# Patient Record
Sex: FEMALE | Race: WHITE | Employment: FULL TIME | URBAN - NONMETROPOLITAN AREA
[De-identification: names, ages, dates, MRNs, and addresses within clinical notes are randomized per-mention and may not be internally consistent; named-entity substitution may affect disease eponyms.]

---

## 2019-04-23 ENCOUNTER — OFFICE VISIT (OUTPATIENT)
Dept: FAMILY MEDICINE CLINIC | Age: 44
End: 2019-04-23
Payer: COMMERCIAL

## 2019-04-23 VITALS
RESPIRATION RATE: 12 BRPM | TEMPERATURE: 97.7 F | HEART RATE: 98 BPM | BODY MASS INDEX: 22.5 KG/M2 | OXYGEN SATURATION: 97 % | SYSTOLIC BLOOD PRESSURE: 138 MMHG | DIASTOLIC BLOOD PRESSURE: 88 MMHG | HEIGHT: 64 IN | WEIGHT: 131.8 LBS

## 2019-04-23 DIAGNOSIS — M54.41 CHRONIC MIDLINE LOW BACK PAIN WITH BILATERAL SCIATICA: ICD-10-CM

## 2019-04-23 DIAGNOSIS — M54.42 CHRONIC MIDLINE LOW BACK PAIN WITH BILATERAL SCIATICA: ICD-10-CM

## 2019-04-23 DIAGNOSIS — F32.A DEPRESSION, UNSPECIFIED DEPRESSION TYPE: ICD-10-CM

## 2019-04-23 DIAGNOSIS — Z23 NEED FOR TDAP VACCINATION: ICD-10-CM

## 2019-04-23 DIAGNOSIS — Z01.00 EYE EXAM NORMAL: ICD-10-CM

## 2019-04-23 DIAGNOSIS — B37.9 YEAST INFECTION: ICD-10-CM

## 2019-04-23 DIAGNOSIS — G89.29 CHRONIC MIDLINE LOW BACK PAIN WITH BILATERAL SCIATICA: ICD-10-CM

## 2019-04-23 DIAGNOSIS — I10 HYPERTENSION, UNSPECIFIED TYPE: ICD-10-CM

## 2019-04-23 DIAGNOSIS — F51.01 PRIMARY INSOMNIA: ICD-10-CM

## 2019-04-23 DIAGNOSIS — Z00.00 ROUTINE HEALTH MAINTENANCE: Primary | ICD-10-CM

## 2019-04-23 DIAGNOSIS — G57.93 NEUROPATHIC PAIN OF BOTH LEGS: ICD-10-CM

## 2019-04-23 DIAGNOSIS — G25.81 RESTLESS LEG SYNDROME: ICD-10-CM

## 2019-04-23 DIAGNOSIS — Z11.4 SCREENING FOR HIV (HUMAN IMMUNODEFICIENCY VIRUS): ICD-10-CM

## 2019-04-23 DIAGNOSIS — Z12.4 CERVICAL CANCER SCREENING: ICD-10-CM

## 2019-04-23 PROCEDURE — 90471 IMMUNIZATION ADMIN: CPT | Performed by: NURSE PRACTITIONER

## 2019-04-23 PROCEDURE — G0444 DEPRESSION SCREEN ANNUAL: HCPCS | Performed by: NURSE PRACTITIONER

## 2019-04-23 PROCEDURE — 99386 PREV VISIT NEW AGE 40-64: CPT | Performed by: NURSE PRACTITIONER

## 2019-04-23 PROCEDURE — 90715 TDAP VACCINE 7 YRS/> IM: CPT | Performed by: NURSE PRACTITIONER

## 2019-04-23 RX ORDER — TRAZODONE HYDROCHLORIDE 100 MG/1
100 TABLET ORAL NIGHTLY
COMMUNITY
End: 2019-04-23 | Stop reason: SDUPTHER

## 2019-04-23 RX ORDER — LAMOTRIGINE 25 MG/1
25 TABLET ORAL DAILY
COMMUNITY
End: 2019-04-23 | Stop reason: SDUPTHER

## 2019-04-23 RX ORDER — PRAZOSIN HYDROCHLORIDE 1 MG/1
1 CAPSULE ORAL NIGHTLY
COMMUNITY
End: 2019-04-23 | Stop reason: SDUPTHER

## 2019-04-23 RX ORDER — HYDROCODONE BITARTRATE AND ACETAMINOPHEN 5; 325 MG/1; MG/1
1 TABLET ORAL EVERY 6 HOURS PRN
COMMUNITY
End: 2019-09-16 | Stop reason: ALTCHOICE

## 2019-04-23 RX ORDER — VENLAFAXINE HYDROCHLORIDE 75 MG/1
75 TABLET, EXTENDED RELEASE ORAL
COMMUNITY
End: 2019-04-23 | Stop reason: SDUPTHER

## 2019-04-23 RX ORDER — FLUCONAZOLE 150 MG/1
TABLET ORAL
Qty: 2 TABLET | Refills: 0 | Status: SHIPPED | OUTPATIENT
Start: 2019-04-23 | End: 2019-09-16 | Stop reason: ALTCHOICE

## 2019-04-23 RX ORDER — TRAZODONE HYDROCHLORIDE 100 MG/1
100 TABLET ORAL NIGHTLY
Qty: 30 TABLET | Refills: 5 | Status: SHIPPED | OUTPATIENT
Start: 2019-04-23 | End: 2019-09-16 | Stop reason: ALTCHOICE

## 2019-04-23 RX ORDER — AMLODIPINE BESYLATE 5 MG/1
5 TABLET ORAL DAILY
COMMUNITY
End: 2019-04-23 | Stop reason: SDUPTHER

## 2019-04-23 RX ORDER — LAMOTRIGINE 25 MG/1
25 TABLET ORAL DAILY
Qty: 30 TABLET | Refills: 5 | Status: SHIPPED | OUTPATIENT
Start: 2019-04-23 | End: 2019-09-16 | Stop reason: ALTCHOICE

## 2019-04-23 RX ORDER — PRAZOSIN HYDROCHLORIDE 1 MG/1
1 CAPSULE ORAL NIGHTLY
Qty: 30 CAPSULE | Refills: 5 | Status: SHIPPED | OUTPATIENT
Start: 2019-04-23 | End: 2020-07-13 | Stop reason: SDUPTHER

## 2019-04-23 RX ORDER — DISULFIRAM 250 MG/1
250 TABLET ORAL DAILY
COMMUNITY
End: 2019-04-23 | Stop reason: ALTCHOICE

## 2019-04-23 RX ORDER — VENLAFAXINE HYDROCHLORIDE 75 MG/1
75 TABLET, EXTENDED RELEASE ORAL
Qty: 30 TABLET | Refills: 5 | Status: SHIPPED | OUTPATIENT
Start: 2019-04-23 | End: 2019-09-16 | Stop reason: ALTCHOICE

## 2019-04-23 RX ORDER — AMLODIPINE BESYLATE 5 MG/1
5 TABLET ORAL DAILY
Qty: 30 TABLET | Refills: 5 | Status: SHIPPED | OUTPATIENT
Start: 2019-04-23 | End: 2019-09-16 | Stop reason: ALTCHOICE

## 2019-04-23 RX ORDER — GABAPENTIN 300 MG/1
300 CAPSULE ORAL 3 TIMES DAILY
COMMUNITY
End: 2019-09-16 | Stop reason: SDUPTHER

## 2019-04-23 SDOH — HEALTH STABILITY: MENTAL HEALTH: HOW OFTEN DO YOU HAVE A DRINK CONTAINING ALCOHOL?: NEVER

## 2019-04-23 ASSESSMENT — PATIENT HEALTH QUESTIONNAIRE - PHQ9
2. FEELING DOWN, DEPRESSED OR HOPELESS: 2
SUM OF ALL RESPONSES TO PHQ QUESTIONS 1-9: 13
1. LITTLE INTEREST OR PLEASURE IN DOING THINGS: 2
9. THOUGHTS THAT YOU WOULD BE BETTER OFF DEAD, OR OF HURTING YOURSELF: 0
5. POOR APPETITE OR OVEREATING: 1
8. MOVING OR SPEAKING SO SLOWLY THAT OTHER PEOPLE COULD HAVE NOTICED. OR THE OPPOSITE, BEING SO FIGETY OR RESTLESS THAT YOU HAVE BEEN MOVING AROUND A LOT MORE THAN USUAL: 0
SUM OF ALL RESPONSES TO PHQ9 QUESTIONS 1 & 2: 4
SUM OF ALL RESPONSES TO PHQ QUESTIONS 1-9: 13
10. IF YOU CHECKED OFF ANY PROBLEMS, HOW DIFFICULT HAVE THESE PROBLEMS MADE IT FOR YOU TO DO YOUR WORK, TAKE CARE OF THINGS AT HOME, OR GET ALONG WITH OTHER PEOPLE: 1
6. FEELING BAD ABOUT YOURSELF - OR THAT YOU ARE A FAILURE OR HAVE LET YOURSELF OR YOUR FAMILY DOWN: 1
4. FEELING TIRED OR HAVING LITTLE ENERGY: 2
7. TROUBLE CONCENTRATING ON THINGS, SUCH AS READING THE NEWSPAPER OR WATCHING TELEVISION: 2
3. TROUBLE FALLING OR STAYING ASLEEP: 3

## 2019-04-23 ASSESSMENT — ENCOUNTER SYMPTOMS
GASTROINTESTINAL NEGATIVE: 1
RESPIRATORY NEGATIVE: 1
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
## 2019-04-23 NOTE — PATIENT INSTRUCTIONS
Patient Education        Learning About Benefits From Quitting Smoking  How does quitting smoking make you healthier? If you're thinking about quitting smoking, you may have a few reasons to be smoke-free. Your health may be one of them. · When you quit smoking, you lower your risks for cancer, lung disease, heart attack, stroke, blood vessel disease, and blindness from macular degeneration. · When you're smoke-free, you get sick less often, and you heal faster. You are less likely to get colds, flu, bronchitis, and pneumonia. · As a nonsmoker, you may find that your mood is better and you are less stressed. When and how will you feel healthier? Quitting has real health benefits that start from day 1 of being smoke-free. And the longer you stay smoke-free, the healthier you get and the better you feel. The first hours  · After just 20 minutes, your blood pressure and heart rate go down. That means there's less stress on your heart and blood vessels. · Within 12 hours, the level of carbon monoxide in your blood drops back to normal. That makes room for more oxygen. With more oxygen in your body, you may notice that you have more energy than when you smoked. After 2 weeks  · Your lungs start to work better. · Your risk of heart attack starts to drop. After 1 month  · When your lungs are clear, you cough less and breathe deeper, so it's easier to be active. · Your sense of taste and smell return. That means you can enjoy food more than you have since you started smoking. Over the years  · After 1 year, your risk of heart disease is half what it would be if you kept smoking. · After 5 years, your risk of stroke starts to shrink. Within a few years after that, it's about the same as if you'd never smoked. · After 10 years, your risk of dying from lung cancer is cut by about half. And your risk for many other types of cancer is lower too. How would quitting help others in your life?   When you quit smoking, you improve the health of everyone who now breathes in your smoke. · Their heart, lung, and cancer risks drop, much like yours. · They are sick less. For babies and small children, living smoke-free means they're less likely to have ear infections, pneumonia, and bronchitis. · If you're a woman who is or will be pregnant someday, quitting smoking means a healthier . · Children who are close to you are less likely to become adult smokers. Where can you learn more? Go to https://[employer]list[employer]. org and sign in to your [employer] account. Enter [phone] 11 in the [employer] box to learn more about \"Learning About Benefits From Quitting Smoking. \"     If you do not have an account, please click on the \"Sign Up Now\" link. Current as of: 2018  Content Version: 11.9  © 9363-2377 [employer]. Care instructions adapted under license by [facility]. If you have questions about a medical condition or this instruction, always ask your healthcare professional. [address] any warranty or liability for your use of this information.

## 2019-04-30 ENCOUNTER — HOSPITAL ENCOUNTER (EMERGENCY)
Age: 44
Discharge: HOME | End: 2019-04-30
Attending: EMERGENCY MEDICINE
Payer: COMMERCIAL

## 2019-04-30 VITALS
HEIGHT: 64 IN | TEMPERATURE: 97.9 F | HEART RATE: 94 BPM | BODY MASS INDEX: 22.71 KG/M2 | WEIGHT: 133 LBS | RESPIRATION RATE: 14 BRPM | SYSTOLIC BLOOD PRESSURE: 161 MMHG | OXYGEN SATURATION: 98 % | DIASTOLIC BLOOD PRESSURE: 105 MMHG

## 2019-04-30 DIAGNOSIS — N30.00 ACUTE CYSTITIS WITHOUT HEMATURIA: Primary | ICD-10-CM

## 2019-04-30 LAB
-: ABNORMAL
AMORPHOUS: ABNORMAL
BACTERIA: ABNORMAL
BILIRUBIN URINE: NEGATIVE
CASTS UA: ABNORMAL /LPF (ref 0–2)
COLOR: ABNORMAL
COMMENT UA: ABNORMAL
CRYSTALS, UA: ABNORMAL /HPF
EPITHELIAL CELLS UA: ABNORMAL /HPF (ref 0–5)
GLUCOSE URINE: NEGATIVE
KETONES, URINE: NEGATIVE
LEUKOCYTE ESTERASE, URINE: ABNORMAL
MUCUS: ABNORMAL
NITRITE, URINE: NEGATIVE
OTHER OBSERVATIONS UA: ABNORMAL
PH UA: 6 (ref 5–6)
PROTEIN UA: NEGATIVE
RBC UA: ABNORMAL /HPF (ref 0–4)
RENAL EPITHELIAL, UA: ABNORMAL /HPF
SPECIFIC GRAVITY UA: 1.03 (ref 1.01–1.02)
TRICHOMONAS: ABNORMAL
TURBIDITY: ABNORMAL
URINE HGB: ABNORMAL
UROBILINOGEN, URINE: NORMAL
WBC UA: ABNORMAL /HPF (ref 0–4)
YEAST: ABNORMAL

## 2019-04-30 PROCEDURE — 81001 URINALYSIS AUTO W/SCOPE: CPT

## 2019-04-30 PROCEDURE — 99283 EMERGENCY DEPT VISIT LOW MDM: CPT

## 2019-04-30 RX ORDER — PHENAZOPYRIDINE HYDROCHLORIDE 200 MG/1
200 TABLET, FILM COATED ORAL 3 TIMES DAILY PRN
Qty: 10 TABLET | Refills: 0 | Status: SHIPPED | OUTPATIENT
Start: 2019-04-30 | End: 2019-09-16 | Stop reason: ALTCHOICE

## 2019-04-30 RX ORDER — SULFAMETHOXAZOLE AND TRIMETHOPRIM 800; 160 MG/1; MG/1
1 TABLET ORAL 2 TIMES DAILY
Qty: 20 TABLET | Refills: 0 | Status: SHIPPED | OUTPATIENT
Start: 2019-04-30 | End: 2019-05-10

## 2019-04-30 ASSESSMENT — PAIN DESCRIPTION - LOCATION: LOCATION: BACK

## 2019-04-30 ASSESSMENT — PAIN SCALES - GENERAL: PAINLEVEL_OUTOF10: 10

## 2019-04-30 ASSESSMENT — PAIN DESCRIPTION - PAIN TYPE: TYPE: ACUTE PAIN

## 2019-04-30 NOTE — ED PROVIDER NOTES
HYDROCODONE-ACETAMINOPHEN (NORCO) 5-325 MG PER TABLET    Take 1 tablet by mouth every 6 hours as needed for Pain. LAMOTRIGINE (LAMICTAL) 25 MG TABLET    Take 1 tablet by mouth daily    PRAZOSIN (MINIPRESS) 1 MG CAPSULE    Take 1 capsule by mouth nightly    TRAZODONE (DESYREL) 100 MG TABLET    Take 1 tablet by mouth nightly    VENLAFAXINE 75 MG EXTENDED RELEASE TABLET    Take 1 tablet by mouth daily (with breakfast)       ALLERGIES     has No Known Allergies. FAMILY HISTORY     indicated that her mother is alive. She indicated that her father is alive. family history includes Heart Disease in her father. SOCIAL HISTORY      reports that she has been smoking cigarettes. She has a 15.00 pack-year smoking history. She has never used smokeless tobacco. She reports that she drinks alcohol. She reports that she does not use drugs. PHYSICAL EXAM     INITIAL VITALS:  height is 5' 4\" (1.626 m) and weight is 133 lb (60.3 kg). Her tympanic temperature is 97.9 °F (36.6 °C). Her blood pressure is 165/101 (abnormal) and her pulse is 94. Her respiration is 14 and oxygen saturation is 98%. Constitutional: The patient is alert and well-developed, vital signs as noted. Psychiatric: Oriented to time, place and person, affect is appropriate for age. Eyes: Pupils equal and reactive to light. EOMI. Ears, nose, and throat: Oropharynx clear  Neck: No masses, trachea midline, and no thyromegaly. Respiratory: Clear to auscultation, full aeration throughout all fields. Cardiovascular: No murmurs, heart sounds normal, no thrills. Gastrointestinal: No masses, no hepatosplenomegaly, bowel sounds positive. No tenderness. Skin: No rashes or lesions on exposed surfaces, good skin turgor. No Flank pain and appears nontoxic and well-hydrated  Extremities: Good range of motion, no edema.       DIFFERENTIAL DIAGNOSIS/ MEDICAL DECISION MAKING:     Antibiotics and Pyridium as directed    No evidence of pyelonephritis    Follow Exit Care instructions closely. I have reviewed the disposition diagnosis with the patient and or their family/guardian. I have answered their questions and given discharge instructions. They voiced understanding of these instructions and did not have any further questions or complaints. DIAGNOSTIC RESULTS     RADIOLOGY:   Non-plain film images such as CT, Ultrasound and MRI are read by the radiologist. [facility] radiographic images are visualized and preliminarily interpreted by the emergency physician with the below findings:  No orders to display       LABS:  Results for orders placed or performed during the hospital encounter of 04/30/19   Urinalysis Reflex to Culture   Result Value Ref Range    Color, UA NOT REPORTED YELLOW    Turbidity UA NOT REPORTED CLEAR    Glucose, Ur NEGATIVE NEGATIVE    Bilirubin Urine NEGATIVE NEGATIVE    Ketones, Urine NEGATIVE NEGATIVE    Specific Gravity, UA 1.030 (H) 1.010 - 1.025    Urine Hgb 3+ (A) NEGATIVE    pH, UA 6.0 5.0 - 6.0    Protein, UA NEGATIVE NEGATIVE    Urobilinogen, Urine Normal Normal    Nitrite, Urine NEGATIVE NEGATIVE    Leukocyte Esterase, Urine 1+ (A) NEGATIVE    Urinalysis Comments NOT REPORTED    Microscopic Urinalysis   Result Value Ref Range    -          WBC, UA 10 TO 25 0 - 4 /HPF    RBC, UA 25 TO 50 0 - 4 /HPF    Casts UA NOT REPORTED 0 - 2 /LPF    Crystals UA 10 TO 25 CALCIUM OXALATE (A) None /HPF    Epithelial Cells UA 5 TO 10 0 - 5 /HPF    Renal Epithelial, Urine NOT REPORTED 0 /HPF    Bacteria, UA TRACE (A) None    Mucus, UA 1+ (A) None    Trichomonas, UA NOT REPORTED None    Amorphous, UA NOT REPORTED None    Other Observations UA NOT REPORTED NOT REQ.     Yeast, UA NOT REPORTED None       ABNORMAL LABS:  Labs Reviewed   URINE RT REFLEX TO CULTURE - Abnormal; Notable for the following components:       Result Value    Specific Gravity, UA 1.030 (*)     Urine Hgb 3+ (*)     Leukocyte Esterase, Urine 1+ (*)     All other components within normal limits   MICROSCOPIC URINALYSIS - Abnormal; Notable for the following components:    Crystals UA 10 TO 25 CALCIUM OXALATE (*)     Bacteria, UA TRACE (*)     Mucus, UA 1+ (*)     All other components within normal limits        EKG: All EKG's are interpreted by the Emergency Department Physician who either signs or Co-signs this chart in the absence of a cardiologist.        EMERGENCY DEPARTMENT COURSE:   Vitals:    Vitals:    04/30/19 1710   BP: (!) 165/101   Pulse: 94   Resp: 14   Temp: 97.9 °F (36.6 °C)   TempSrc: Tympanic   SpO2: 98%   Weight: 133 lb (60.3 kg)   Height: 5' 4\" (1.626 m)     -------------------------  BP: (!) 165/101, Temp: 97.9 °F (36.6 °C), Pulse: 94, Resp: 14    See DDX/MDM (Differential Diagnosis/Medical Decision Making) above      FINAL IMPRESSION      1.  Acute cystitis without hematuria          DISPOSITION/PLAN   DISPOSITION Decision To Discharge 04/30/2019 06:24:28 PM      Condition on Disposition    Fair    PATIENT REFERRED TO:  TAMARA Kothari - [facility]  [phone]    In 2 days        DISCHARGE MEDICATIONS:  New Prescriptions    PHENAZOPYRIDINE (PYRIDIUM) 200 MG TABLET    Take 1 tablet by mouth 3 times daily as needed for Pain (bladder spasm/pain)    SULFAMETHOXAZOLE-TRIMETHOPRIM (BACTRIM DS) 800-160 MG PER TABLET    Take 1 tablet by mouth 2 times daily for 10 days       (Please note that portions of this note were completed with a voice recognition program.  Efforts were made to edit the dictations but occasionally words are mis-transcribed.)    Jen Danielson,, DO  Attending Emergency Physician       [address] [place] [address],   04/30/19 5449

## 2019-05-30 ENCOUNTER — HOSPITAL ENCOUNTER (OUTPATIENT)
Dept: LAB | Age: 44
Discharge: HOME | End: 2019-05-30
Payer: COMMERCIAL

## 2019-05-30 ENCOUNTER — OFFICE VISIT (OUTPATIENT)
Dept: OBGYN | Age: 44
End: 2019-05-30
Payer: COMMERCIAL

## 2019-05-30 VITALS
RESPIRATION RATE: 20 BRPM | SYSTOLIC BLOOD PRESSURE: 144 MMHG | HEART RATE: 78 BPM | WEIGHT: 135 LBS | HEIGHT: 64 IN | BODY MASS INDEX: 23.05 KG/M2 | DIASTOLIC BLOOD PRESSURE: 98 MMHG

## 2019-05-30 DIAGNOSIS — N95.1 MENOPAUSAL SYMPTOMS: ICD-10-CM

## 2019-05-30 DIAGNOSIS — Z12.4 PAP SMEAR FOR CERVICAL CANCER SCREENING: ICD-10-CM

## 2019-05-30 DIAGNOSIS — Z01.419 WOMEN'S ANNUAL ROUTINE GYNECOLOGICAL EXAMINATION: Primary | ICD-10-CM

## 2019-05-30 DIAGNOSIS — N89.8 VAGINAL DISCHARGE: ICD-10-CM

## 2019-05-30 LAB
ESTRADIOL LEVEL: <5 PG/ML (ref 27–314)
FOLLICLE STIMULATING HORMONE: 66.1 U/L (ref 1.7–21.5)
LH: 33.8 U/L (ref 1–95.6)
SEX HORMONE BINDING GLOBULIN: 41 NMOL/L (ref 30–135)
TESTOSTERONE FREE-NONMALE: 1.4 PG/ML (ref 1.1–5.8)
TESTOSTERONE TOTAL: 9 NG/DL (ref 20–70)

## 2019-05-30 PROCEDURE — 84403 ASSAY OF TOTAL TESTOSTERONE: CPT

## 2019-05-30 PROCEDURE — G0145 SCR C/V CYTO,THINLAYER,RESCR: HCPCS

## 2019-05-30 PROCEDURE — 99386 PREV VISIT NEW AGE 40-64: CPT | Performed by: OBSTETRICS & GYNECOLOGY

## 2019-05-30 PROCEDURE — 36415 COLL VENOUS BLD VENIPUNCTURE: CPT

## 2019-05-30 PROCEDURE — 87070 CULTURE OTHR SPECIMN AEROBIC: CPT

## 2019-05-30 PROCEDURE — 84270 ASSAY OF SEX HORMONE GLOBUL: CPT

## 2019-05-30 PROCEDURE — 83001 ASSAY OF GONADOTROPIN (FSH): CPT

## 2019-05-30 PROCEDURE — 82670 ASSAY OF TOTAL ESTRADIOL: CPT

## 2019-05-30 PROCEDURE — 83002 ASSAY OF GONADOTROPIN (LH): CPT

## 2019-05-30 NOTE — PROGRESS NOTES
Kylee Thao  5/30/2019              37 y.o. Chief Complaint   Patient presents with   [facility] Gynecologic Exam     no problems or concerns         No LMP recorded. Patient has had a hysterectomy. Primary Care Physician: TAMARA Arvizu CNP    The patient was seen and examined. She is here for her annual exam.  Her bowels are regular. There are no voiding complaints. She denies any bloating. She denies vaginal discharge and was counseled on STD's and family planning. Performs Monthly Self Breast Exam: yes  Exercise: yes      HPI : Kylee Thao is a 37 y.o. female No obstetric history on file. Gynecologic Exam   The current episode started 1 to 4 weeks ago. The problem occurs intermittently. The problem has been waxing and waning. Nothing aggravates the symptoms. She has tried nothing for the symptoms. The treatment provided no relief. Review of Systems   Genitourinary: Positive for vaginal discharge. All other systems reviewed and are negative. Physical Exam   Constitutional: She is oriented to person, place, and time. She appears well-developed and well-nourished. No distress. Eyes: Pupils are equal, round, and reactive to light. Neck: Normal range of motion. Neck supple. Pulmonary/Chest: Effort normal. No respiratory distress. She has no wheezes. Abdominal: Soft. She exhibits no distension. There is no tenderness. There is no guarding. Genitourinary: Vaginal discharge found. Genitourinary Comments: Whitish discharge, normal vaginal vault, no lesions seen, slightly tender, no masses felt. Musculoskeletal: Normal range of motion. She exhibits no edema or deformity. Neurological: She is alert and oriented to person, place, and time. Skin: Skin is warm. Psychiatric: She has a normal mood and affect. Her behavior is normal.   Nursing note and vitals reviewed.       ________________________________________________________________________  OB History   No data available     Past Medical History:   Diagnosis Date    Anxiety     Chronic back pain     Depression     Hypertension     Neuropathy                                                                    Past Surgical History:   Procedure Laterality Date    HYSTERECTOMY, TOTAL ABDOMINAL      WISDOM TOOTH EXTRACTION       Family History   Problem Relation Age of Onset    Heart Disease Father      Social History     Socioeconomic History    Marital status:      Spouse name: Not on file    Number of children: Not on file    Years of education: Not on file    Highest education level: Not on file   Occupational History    Not on file   Social Needs    Financial resource strain: Not on file    Food insecurity:     Worry: Not on file     Inability: Not on file    Transportation needs:     Medical: Not on file     Non-medical: Not on file   Tobacco Use    Smoking status: Current Every Day Smoker     Packs/day: 0.50     Years: 30.00     Pack years: 15.00     Types: Cigarettes    Smokeless tobacco: Never Used   Substance and Sexual Activity    Alcohol use: Yes     Frequency: Never     Comment: socially    Drug use: Never    Sexual activity: Yes     Partners: Male     Comment: with  of 14 years   Lifestyle    Physical activity:     Days per week: Not on file     Minutes per session: Not on file    Stress: Not on file   Relationships    Social connections:     Talks on phone: Not on file     Gets together: Not on file     Attends Anglican service: Not on file     Active member of club or organization: Not on file     Attends meetings of clubs or organizations: Not on file     Relationship status: Not on file    Intimate partner violence:     Fear of current or ex partner: Not on file     Emotionally abused: Not on file     Physically abused: Not on file     Forced sexual activity: Not on file   Other Topics Concern    Not on file   Social History Narrative    Not on file discharge  Culture, Genital   4. Menopausal symptoms  Luteinizing Hormone    Follicle Stimulating Hormone    Estradiol    Testosterone, Free    PEDRO DIGITAL SCREEN W CAD BILATERAL            Chief Complaint   Patient presents with    Gynecologic Exam     no problems or concerns          Past Medical History:   Diagnosis Date    Anxiety     Chronic back pain     Depression     Hypertension     Neuropathy          Patient Active Problem List   Diagnosis    Hypertension    Depression    Primary insomnia    Restless leg syndrome          Hereditary Breast, Ovarian, Colon and Uterine Cancer screening Done. Tobacco & Secondary smoke risks reviewed; instructed on cessation and avoidance      Counseling Completed:  20 minutes           PLAN:  No follow-ups on file. Repeat pap every 3 year if negative. Mammograms every 1 year   Supplements reviewd  Hormonal studies to see if she is menopausal stated then she needs hormone replacement therapy as she still young and needs this for her body at least up to age of 48.   Order mammogram    Ivette Quintana MD

## 2019-06-02 LAB
CULTURE: NORMAL
Lab: NORMAL
SPECIMEN DESCRIPTION: NORMAL

## 2019-06-03 LAB — CYTOLOGY REPORT: NORMAL

## 2019-06-10 ENCOUNTER — OFFICE VISIT (OUTPATIENT)
Dept: OBGYN | Age: 44
End: 2019-06-10
Payer: COMMERCIAL

## 2019-06-10 VITALS
BODY MASS INDEX: 24.92 KG/M2 | WEIGHT: 146 LBS | HEART RATE: 92 BPM | HEIGHT: 64 IN | DIASTOLIC BLOOD PRESSURE: 90 MMHG | SYSTOLIC BLOOD PRESSURE: 142 MMHG

## 2019-06-10 DIAGNOSIS — N95.1 MENOPAUSAL AND FEMALE CLIMACTERIC STATES: ICD-10-CM

## 2019-06-10 DIAGNOSIS — J34.89 DRAINAGE FROM NOSE: ICD-10-CM

## 2019-06-10 DIAGNOSIS — N95.1 MENOPAUSAL SYMPTOMS: Primary | ICD-10-CM

## 2019-06-10 DIAGNOSIS — H65.112 NON-RECURRENT ACUTE ALLERGIC OTITIS MEDIA OF LEFT EAR: ICD-10-CM

## 2019-06-10 PROCEDURE — 99212 OFFICE O/P EST SF 10 MIN: CPT | Performed by: OBSTETRICS & GYNECOLOGY

## 2019-06-10 RX ORDER — LORATADINE 10 MG/1
10 TABLET ORAL DAILY
Qty: 7 TABLET | Refills: 0 | Status: SHIPPED | OUTPATIENT
Start: 2019-06-10 | End: 2019-06-17

## 2019-06-10 RX ORDER — CEPHALEXIN 500 MG/1
500 CAPSULE ORAL 3 TIMES DAILY
Qty: 21 CAPSULE | Refills: 0 | Status: SHIPPED | OUTPATIENT
Start: 2019-06-10 | End: 2019-06-17

## 2019-06-10 NOTE — PROGRESS NOTES
Subjective:      Patient ID: Silvio Fish  is a 37 y.o. y.o. Female. Hormones are menopausal level, with normal pap smear   C/o stuffed nose and pain in left ear  HPI  Chief Complaint   Patient presents with    Follow-up     post lab wanting hormone replacement     Family History   Problem Relation Age of Onset    Heart Disease Father      Past Medical History:   Diagnosis Date    Anxiety     Chronic back pain     Depression     Hypertension     Neuropathy      Past Surgical History:   Procedure Laterality Date    HYSTERECTOMY, TOTAL ABDOMINAL      WISDOM TOOTH EXTRACTION        reports that she has been smoking cigarettes. She has a 15.00 pack-year smoking history. She has never used smokeless tobacco. She reports that she drinks alcohol. She reports that she does not use drugs. No Known Allergies    Current Outpatient Medications:     estrogens, conjugated, (PREMARIN) 0.625 MG tablet, Take 1 tablet by mouth daily, Disp: 30 tablet, Rfl: 3    cephALEXin (KEFLEX) 500 MG capsule, Take 1 capsule by mouth 3 times daily for 7 days, Disp: 21 capsule, Rfl: 0    loratadine (CLARITIN) 10 MG tablet, Take 1 tablet by mouth daily for 7 days, Disp: 7 tablet, Rfl: 0    phenazopyridine (PYRIDIUM) 200 MG tablet, Take 1 tablet by mouth 3 times daily as needed for Pain (bladder spasm/pain), Disp: 10 tablet, Rfl: 0    gabapentin (NEURONTIN) 300 MG capsule, Take 300 mg by mouth 3 times daily. , Disp: , Rfl:     HYDROcodone-acetaminophen (NORCO) 5-325 MG per tablet, Take 1 tablet by mouth every 6 hours as needed for Pain., Disp: , Rfl:     fluconazole (DIFLUCAN) 150 MG tablet, Take 1 pill now, 1 pill in a week., Disp: 2 tablet, Rfl: 0    venlafaxine 75 MG extended release tablet, Take 1 tablet by mouth daily (with breakfast), Disp: 30 tablet, Rfl: 5    prazosin (MINIPRESS) 1 MG capsule, Take 1 capsule by mouth nightly, Disp: 30 capsule, Rfl: 5    lamoTRIgine (LAMICTAL) 25 MG tablet, Take 1 tablet by mouth daily, Disp: 30 tablet, Rfl: 5    amLODIPine (NORVASC) 5 MG tablet, Take 1 tablet by mouth daily, Disp: 30 tablet, Rfl: 5    traZODone (DESYREL) 100 MG tablet, Take 1 tablet by mouth nightly, Disp: 30 tablet, Rfl: 5      Review of Systems   HENT: Positive for congestion and ear pain. All other systems reviewed and are negative. Breast ROS: negative    Objective:   BP (!) 142/90 (Site: Right Upper Arm, Position: Sitting, Cuff Size: Medium Adult)   Pulse 92   Ht 5' 4.02\" (1.626 m)   Wt 146 lb (66.2 kg)   BMI 25.05 kg/m²     Physical Exam   Constitutional: She appears well-developed and well-nourished. No distress. HENT:   Left ear reddish,    Nursing note and vitals reviewed. Breast exam: WNL, No skin retraction, dimpling or skin discoloration. No nippledischarge. Any bleeding or pain withintercourse: No      Do you do self breast exams: Yes    Assessment:      Diagnosis Orders   1. Menopausal symptoms     2. Menopausal and female climacteric states     3. Non-recurrent acute allergic otitis media of left ear     4. Drainage from nose             Plan:   No orders of the defined types were placed in this encounter.   Patient with the patient about her hormone replacement therapy with estrogen side effects possible risks and benefits explained to the patient agreed to have it done and will  reevaluated in 3 months          Gretchen Wang MD

## 2019-07-23 ENCOUNTER — TELEPHONE (OUTPATIENT)
Dept: FAMILY MEDICINE CLINIC | Age: 44
End: 2019-07-23

## 2019-07-30 ENCOUNTER — HOSPITAL ENCOUNTER (OUTPATIENT)
Dept: LAB | Age: 44
Discharge: HOME | End: 2019-07-30
Payer: COMMERCIAL

## 2019-07-30 DIAGNOSIS — Z00.00 ROUTINE HEALTH MAINTENANCE: ICD-10-CM

## 2019-07-30 DIAGNOSIS — Z11.4 SCREENING FOR HIV (HUMAN IMMUNODEFICIENCY VIRUS): ICD-10-CM

## 2019-07-30 DIAGNOSIS — I10 HYPERTENSION, UNSPECIFIED TYPE: ICD-10-CM

## 2019-07-30 LAB
ABSOLUTE EOS #: 0.1 K/UL (ref 0–0.4)
ABSOLUTE IMMATURE GRANULOCYTE: ABNORMAL K/UL (ref 0–0.3)
ABSOLUTE LYMPH #: 1.9 K/UL (ref 1–4.8)
ABSOLUTE MONO #: 0.4 K/UL (ref 0.1–1.2)
ALBUMIN SERPL-MCNC: 4.4 G/DL (ref 3.5–5.2)
ALBUMIN/GLOBULIN RATIO: 1.5 (ref 1–2.5)
ALP BLD-CCNC: 94 U/L (ref 35–104)
ALT SERPL-CCNC: 18 U/L (ref 5–33)
ANION GAP SERPL CALCULATED.3IONS-SCNC: 14 MMOL/L (ref 9–17)
AST SERPL-CCNC: 14 U/L
BASOPHILS # BLD: 1 % (ref 0–1)
BASOPHILS ABSOLUTE: 0 K/UL (ref 0–0.2)
BILIRUB SERPL-MCNC: 0.15 MG/DL (ref 0.3–1.2)
BUN BLDV-MCNC: 18 MG/DL (ref 6–20)
BUN/CREAT BLD: 29 (ref 9–20)
CALCIUM SERPL-MCNC: 9.5 MG/DL (ref 8.6–10.4)
CHLORIDE BLD-SCNC: 102 MMOL/L (ref 98–107)
CHOLESTEROL/HDL RATIO: 3.9
CHOLESTEROL: 230 MG/DL
CO2: 25 MMOL/L (ref 20–31)
CREAT SERPL-MCNC: 0.63 MG/DL (ref 0.5–0.9)
DIFFERENTIAL TYPE: ABNORMAL
EOSINOPHILS RELATIVE PERCENT: 2 % (ref 1–7)
GFR AFRICAN AMERICAN: >60 ML/MIN
GFR NON-AFRICAN AMERICAN: >60 ML/MIN
GFR SERPL CREATININE-BSD FRML MDRD: ABNORMAL ML/MIN/{1.73_M2}
GFR SERPL CREATININE-BSD FRML MDRD: ABNORMAL ML/MIN/{1.73_M2}
GLUCOSE BLD-MCNC: 212 MG/DL (ref 70–99)
HCT VFR BLD CALC: 37.4 % (ref 36–46)
HDLC SERPL-MCNC: 59 MG/DL
HEMOGLOBIN: 12.6 G/DL (ref 12–16)
HIV AG/AB: NONREACTIVE
IMMATURE GRANULOCYTES: ABNORMAL %
LDL CHOLESTEROL: 140 MG/DL (ref 0–130)
LYMPHOCYTES # BLD: 33 % (ref 16–46)
MCH RBC QN AUTO: 31.7 PG (ref 26–34)
MCHC RBC AUTO-ENTMCNC: 33.7 G/DL (ref 31–37)
MCV RBC AUTO: 94.1 FL (ref 80–100)
MONOCYTES # BLD: 7 % (ref 4–11)
NRBC AUTOMATED: ABNORMAL PER 100 WBC
PDW BLD-RTO: 14.2 % (ref 11–14.5)
PLATELET # BLD: 379 K/UL (ref 140–450)
PLATELET ESTIMATE: ABNORMAL
PMV BLD AUTO: 6.2 FL (ref 6–12)
POTASSIUM SERPL-SCNC: 4.3 MMOL/L (ref 3.7–5.3)
RBC # BLD: 3.97 M/UL (ref 4–5.2)
RBC # BLD: ABNORMAL 10*6/UL
SEG NEUTROPHILS: 57 % (ref 43–77)
SEGMENTED NEUTROPHILS ABSOLUTE COUNT: 3.4 K/UL (ref 1.8–7.7)
SODIUM BLD-SCNC: 141 MMOL/L (ref 135–144)
TOTAL PROTEIN: 7.3 G/DL (ref 6.4–8.3)
TRIGL SERPL-MCNC: 154 MG/DL
VLDLC SERPL CALC-MCNC: ABNORMAL MG/DL (ref 1–30)
WBC # BLD: 5.9 K/UL (ref 3.5–11)
WBC # BLD: ABNORMAL 10*3/UL

## 2019-07-30 PROCEDURE — 36415 COLL VENOUS BLD VENIPUNCTURE: CPT

## 2019-07-30 PROCEDURE — 80053 COMPREHEN METABOLIC PANEL: CPT

## 2019-07-30 PROCEDURE — 85025 COMPLETE CBC W/AUTO DIFF WBC: CPT

## 2019-07-30 PROCEDURE — 80061 LIPID PANEL: CPT

## 2019-07-30 PROCEDURE — 87389 HIV-1 AG W/HIV-1&-2 AB AG IA: CPT

## 2019-08-13 ENCOUNTER — TELEPHONE (OUTPATIENT)
Dept: OBGYN | Age: 44
End: 2019-08-13

## 2019-09-16 ENCOUNTER — OFFICE VISIT (OUTPATIENT)
Dept: FAMILY MEDICINE CLINIC | Age: 44
End: 2019-09-16
Payer: COMMERCIAL

## 2019-09-16 VITALS
OXYGEN SATURATION: 98 % | HEIGHT: 64 IN | DIASTOLIC BLOOD PRESSURE: 90 MMHG | HEART RATE: 94 BPM | BODY MASS INDEX: 27.66 KG/M2 | WEIGHT: 162 LBS | SYSTOLIC BLOOD PRESSURE: 152 MMHG

## 2019-09-16 DIAGNOSIS — F51.01 PRIMARY INSOMNIA: ICD-10-CM

## 2019-09-16 DIAGNOSIS — I10 HYPERTENSION, UNSPECIFIED TYPE: Primary | ICD-10-CM

## 2019-09-16 DIAGNOSIS — G57.93 NEUROPATHIC PAIN OF BOTH LEGS: ICD-10-CM

## 2019-09-16 DIAGNOSIS — F32.A DEPRESSION, UNSPECIFIED DEPRESSION TYPE: ICD-10-CM

## 2019-09-16 PROCEDURE — 99214 OFFICE O/P EST MOD 30 MIN: CPT | Performed by: NURSE PRACTITIONER

## 2019-09-16 RX ORDER — HYDROXYZINE PAMOATE 50 MG/1
50 CAPSULE ORAL DAILY
COMMUNITY
End: 2019-09-16 | Stop reason: SDUPTHER

## 2019-09-16 RX ORDER — LISINOPRIL 10 MG/1
10 TABLET ORAL DAILY
Qty: 30 TABLET | Refills: 1 | Status: SHIPPED | OUTPATIENT
Start: 2019-09-16 | End: 2019-12-05 | Stop reason: SDUPTHER

## 2019-09-16 RX ORDER — HYDROXYZINE PAMOATE 50 MG/1
50 CAPSULE ORAL DAILY
Qty: 30 CAPSULE | Refills: 1 | Status: SHIPPED | OUTPATIENT
Start: 2019-09-16 | End: 2019-10-16

## 2019-09-16 RX ORDER — BUPROPION HYDROCHLORIDE 150 MG/1
150 TABLET ORAL EVERY MORNING
COMMUNITY
End: 2019-09-16 | Stop reason: SDUPTHER

## 2019-09-16 RX ORDER — GABAPENTIN 300 MG/1
300 CAPSULE ORAL 3 TIMES DAILY
Qty: 90 CAPSULE | Refills: 1 | Status: SHIPPED | OUTPATIENT
Start: 2019-09-16 | End: 2019-12-05 | Stop reason: SDUPTHER

## 2019-09-16 RX ORDER — ROPINIROLE 1 MG/1
1 TABLET, FILM COATED ORAL NIGHTLY
COMMUNITY
End: 2019-12-05 | Stop reason: SDUPTHER

## 2019-09-16 RX ORDER — BUPROPION HYDROCHLORIDE 150 MG/1
150 TABLET ORAL EVERY MORNING
Qty: 30 TABLET | Refills: 2 | Status: SHIPPED | OUTPATIENT
Start: 2019-09-16 | End: 2019-12-05 | Stop reason: SDUPTHER

## 2019-09-16 RX ORDER — MIRTAZAPINE 15 MG/1
15 TABLET, FILM COATED ORAL NIGHTLY
Qty: 30 TABLET | Refills: 0 | Status: SHIPPED | OUTPATIENT
Start: 2019-09-16 | End: 2019-10-14 | Stop reason: SDUPTHER

## 2019-09-16 RX ORDER — ROPINIROLE 1 MG/1
1 TABLET, FILM COATED ORAL NIGHTLY
Qty: 30 TABLET | Refills: 1 | Status: CANCELLED | OUTPATIENT
Start: 2019-09-16

## 2019-09-16 RX ORDER — IBUPROFEN 800 MG/1
800 TABLET ORAL EVERY 6 HOURS PRN
Qty: 120 TABLET | Refills: 1 | Status: SHIPPED | OUTPATIENT
Start: 2019-09-16 | End: 2019-12-17 | Stop reason: SDUPTHER

## 2019-09-16 RX ORDER — LISINOPRIL 10 MG/1
10 TABLET ORAL DAILY
COMMUNITY
End: 2019-09-16 | Stop reason: SDUPTHER

## 2019-09-16 RX ORDER — CYCLOBENZAPRINE HCL 10 MG
10 TABLET ORAL 3 TIMES DAILY PRN
Qty: 90 TABLET | Refills: 1 | Status: SHIPPED | OUTPATIENT
Start: 2019-09-16 | End: 2020-02-06

## 2019-09-16 RX ORDER — IBUPROFEN 800 MG/1
800 TABLET ORAL EVERY 6 HOURS PRN
COMMUNITY
End: 2019-09-16 | Stop reason: SDUPTHER

## 2019-09-16 RX ORDER — PRAZOSIN HYDROCHLORIDE 1 MG/1
1 CAPSULE ORAL NIGHTLY
Qty: 30 CAPSULE | Refills: 5 | Status: CANCELLED | OUTPATIENT
Start: 2019-09-16 | End: 2019-10-16

## 2019-09-16 RX ORDER — CYCLOBENZAPRINE HCL 10 MG
10 TABLET ORAL 3 TIMES DAILY PRN
COMMUNITY
End: 2019-09-16 | Stop reason: SDUPTHER

## 2019-09-16 RX ORDER — MIRTAZAPINE 15 MG/1
15 TABLET, FILM COATED ORAL NIGHTLY
COMMUNITY
End: 2019-09-16 | Stop reason: SDUPTHER

## 2019-09-16 ASSESSMENT — ENCOUNTER SYMPTOMS
BACK PAIN: 1
COUGH: 0
WHEEZING: 0
SHORTNESS OF BREATH: 0

## 2019-09-16 NOTE — PROGRESS NOTES
Chronic back pain     Depression     Hypertension     Neuropathy        Past Surgical History:   Procedure Laterality Date    HYSTERECTOMY, TOTAL ABDOMINAL      WISDOM TOOTH EXTRACTION         Social History     Socioeconomic History    Marital status:      Spouse name: None    Number of children: None    Years of education: None    Highest education level: None   Occupational History    None   Social Needs    Financial resource strain: None    Food insecurity:     Worry: None     Inability: None    Transportation needs:     Medical: None     Non-medical: None   Tobacco Use    Smoking status: Current Every Day Smoker     Packs/day: 0.50     Years: 30.00     Pack years: 15.00     Types: Cigarettes    Smokeless tobacco: Never Used   Substance and Sexual Activity    Alcohol use: Yes     Frequency: Never     Comment: socially    Drug use: Never    Sexual activity: Yes     Partners: Male     Comment: with  of 14 years   Lifestyle    Physical activity:     Days per week: None     Minutes per session: None    Stress: None   Relationships    Social connections:     Talks on phone: None     Gets together: None     Attends Bahai service: None     Active member of club or organization: None     Attends meetings of clubs or organizations: None     Relationship status: None    Intimate partner violence:     Fear of current or ex partner: None     Emotionally abused: None     Physically abused: None     Forced sexual activity: None   Other Topics Concern    None   Social History Narrative    None       Family History   Problem Relation Age of Onset    Heart Disease Father        No Known Allergies    Current Outpatient Medications   Medication Sig Dispense Refill    rOPINIRole (REQUIP) 1 MG tablet Take 1 mg by mouth nightly      gabapentin (NEURONTIN) 300 MG capsule Take 1 capsule by mouth 3 times daily for 30 days.  90 capsule 1    buPROPion (WELLBUTRIN XL) 150 MG extended release

## 2019-09-23 ENCOUNTER — TELEPHONE (OUTPATIENT)
Dept: OBGYN | Age: 44
End: 2019-09-23

## 2019-09-23 DIAGNOSIS — Z12.31 SCREENING MAMMOGRAM, ENCOUNTER FOR: Primary | ICD-10-CM

## 2019-10-09 ENCOUNTER — TELEPHONE (OUTPATIENT)
Dept: OBGYN | Age: 44
End: 2019-10-09

## 2019-10-14 DIAGNOSIS — F32.A DEPRESSION, UNSPECIFIED DEPRESSION TYPE: ICD-10-CM

## 2019-10-14 DIAGNOSIS — F51.01 PRIMARY INSOMNIA: ICD-10-CM

## 2019-10-14 RX ORDER — MIRTAZAPINE 15 MG/1
TABLET, FILM COATED ORAL
Qty: 30 TABLET | Refills: 0 | Status: SHIPPED | OUTPATIENT
Start: 2019-10-14 | End: 2019-12-05 | Stop reason: SDUPTHER

## 2019-11-18 DIAGNOSIS — I10 HYPERTENSION, UNSPECIFIED TYPE: ICD-10-CM

## 2019-11-19 ENCOUNTER — TELEPHONE (OUTPATIENT)
Dept: OBGYN | Age: 44
End: 2019-11-19

## 2019-11-19 RX ORDER — LISINOPRIL 10 MG/1
10 TABLET ORAL DAILY
Qty: 30 TABLET | Refills: 1 | OUTPATIENT
Start: 2019-11-19 | End: 2019-12-19

## 2019-11-19 RX ORDER — GABAPENTIN 300 MG/1
300 CAPSULE ORAL 3 TIMES DAILY
Qty: 90 CAPSULE | Refills: 1 | OUTPATIENT
Start: 2019-11-19 | End: 2019-12-19

## 2019-12-05 ENCOUNTER — OFFICE VISIT (OUTPATIENT)
Dept: PRIMARY CARE CLINIC | Age: 44
End: 2019-12-05
Payer: COMMERCIAL

## 2019-12-05 VITALS
SYSTOLIC BLOOD PRESSURE: 140 MMHG | HEART RATE: 96 BPM | BODY MASS INDEX: 28.84 KG/M2 | WEIGHT: 168 LBS | DIASTOLIC BLOOD PRESSURE: 100 MMHG | OXYGEN SATURATION: 98 %

## 2019-12-05 DIAGNOSIS — F51.01 PRIMARY INSOMNIA: ICD-10-CM

## 2019-12-05 DIAGNOSIS — I10 HYPERTENSION, UNSPECIFIED TYPE: ICD-10-CM

## 2019-12-05 DIAGNOSIS — G25.81 RESTLESS LEG SYNDROME: ICD-10-CM

## 2019-12-05 DIAGNOSIS — G57.93 NEUROPATHIC PAIN OF BOTH LEGS: Primary | ICD-10-CM

## 2019-12-05 DIAGNOSIS — H65.02 ACUTE SEROUS OTITIS MEDIA OF LEFT EAR, RECURRENCE NOT SPECIFIED: ICD-10-CM

## 2019-12-05 DIAGNOSIS — F32.A DEPRESSION, UNSPECIFIED DEPRESSION TYPE: ICD-10-CM

## 2019-12-05 LAB
CHP ED QC CHECK: NORMAL
GLUCOSE BLD-MCNC: 103 MG/DL

## 2019-12-05 PROCEDURE — 82962 GLUCOSE BLOOD TEST: CPT | Performed by: FAMILY MEDICINE

## 2019-12-05 PROCEDURE — 99214 OFFICE O/P EST MOD 30 MIN: CPT | Performed by: FAMILY MEDICINE

## 2019-12-05 RX ORDER — BUPROPION HYDROCHLORIDE 150 MG/1
150 TABLET ORAL EVERY MORNING
Qty: 30 TABLET | Refills: 0 | Status: SHIPPED | OUTPATIENT
Start: 2019-12-05 | End: 2019-12-17 | Stop reason: DRUGHIGH

## 2019-12-05 RX ORDER — GABAPENTIN 300 MG/1
300 CAPSULE ORAL 3 TIMES DAILY
Qty: 90 CAPSULE | Refills: 0 | Status: SHIPPED | OUTPATIENT
Start: 2019-12-05 | End: 2020-01-17

## 2019-12-05 RX ORDER — LISINOPRIL 10 MG/1
10 TABLET ORAL DAILY
Qty: 30 TABLET | Refills: 0 | Status: SHIPPED | OUTPATIENT
Start: 2019-12-05 | End: 2019-12-17 | Stop reason: DRUGHIGH

## 2019-12-05 RX ORDER — MIRTAZAPINE 15 MG/1
TABLET, FILM COATED ORAL
Qty: 30 TABLET | Refills: 0 | Status: SHIPPED | OUTPATIENT
Start: 2019-12-05 | End: 2020-01-17

## 2019-12-05 RX ORDER — ROPINIROLE 1 MG/1
1 TABLET, FILM COATED ORAL NIGHTLY
Qty: 30 TABLET | Refills: 0 | Status: SHIPPED | OUTPATIENT
Start: 2019-12-05 | End: 2020-01-17

## 2019-12-05 RX ORDER — FLUTICASONE PROPIONATE 50 MCG
1 SPRAY, SUSPENSION (ML) NASAL DAILY
Qty: 1 BOTTLE | Refills: 0 | Status: SHIPPED | OUTPATIENT
Start: 2019-12-05 | End: 2022-08-15 | Stop reason: SDUPTHER

## 2019-12-10 RX ORDER — CONJUGATED ESTROGENS 0.62 MG/1
TABLET, FILM COATED ORAL
Qty: 30 TABLET | Refills: 3 | Status: SHIPPED | OUTPATIENT
Start: 2019-12-10 | End: 2019-12-17 | Stop reason: DRUGHIGH

## 2019-12-17 ENCOUNTER — OFFICE VISIT (OUTPATIENT)
Dept: FAMILY MEDICINE CLINIC | Age: 44
End: 2019-12-17
Payer: COMMERCIAL

## 2019-12-17 VITALS
HEIGHT: 64 IN | HEART RATE: 94 BPM | WEIGHT: 167 LBS | BODY MASS INDEX: 28.51 KG/M2 | SYSTOLIC BLOOD PRESSURE: 138 MMHG | DIASTOLIC BLOOD PRESSURE: 84 MMHG | OXYGEN SATURATION: 99 % | TEMPERATURE: 97.4 F | RESPIRATION RATE: 18 BRPM

## 2019-12-17 DIAGNOSIS — M54.41 CHRONIC MIDLINE LOW BACK PAIN WITH BILATERAL SCIATICA: ICD-10-CM

## 2019-12-17 DIAGNOSIS — Z23 NEED FOR VACCINATION: ICD-10-CM

## 2019-12-17 DIAGNOSIS — K21.00 GASTROESOPHAGEAL REFLUX DISEASE WITH ESOPHAGITIS: ICD-10-CM

## 2019-12-17 DIAGNOSIS — M54.42 CHRONIC MIDLINE LOW BACK PAIN WITH BILATERAL SCIATICA: ICD-10-CM

## 2019-12-17 DIAGNOSIS — G89.29 CHRONIC MIDLINE LOW BACK PAIN WITH BILATERAL SCIATICA: ICD-10-CM

## 2019-12-17 DIAGNOSIS — Z13.1 ENCOUNTER FOR SCREENING FOR DIABETES MELLITUS: ICD-10-CM

## 2019-12-17 DIAGNOSIS — I10 HYPERTENSION, UNSPECIFIED TYPE: Primary | ICD-10-CM

## 2019-12-17 DIAGNOSIS — Z72.0 TOBACCO USE: ICD-10-CM

## 2019-12-17 DIAGNOSIS — G25.81 RESTLESS LEG SYNDROME: ICD-10-CM

## 2019-12-17 DIAGNOSIS — F32.A DEPRESSION, UNSPECIFIED DEPRESSION TYPE: ICD-10-CM

## 2019-12-17 PROCEDURE — G8427 DOCREV CUR MEDS BY ELIG CLIN: HCPCS | Performed by: NURSE PRACTITIONER

## 2019-12-17 PROCEDURE — 90472 IMMUNIZATION ADMIN EACH ADD: CPT | Performed by: NURSE PRACTITIONER

## 2019-12-17 PROCEDURE — 90471 IMMUNIZATION ADMIN: CPT | Performed by: NURSE PRACTITIONER

## 2019-12-17 PROCEDURE — G8482 FLU IMMUNIZE ORDER/ADMIN: HCPCS | Performed by: NURSE PRACTITIONER

## 2019-12-17 PROCEDURE — 99214 OFFICE O/P EST MOD 30 MIN: CPT | Performed by: NURSE PRACTITIONER

## 2019-12-17 PROCEDURE — 4004F PT TOBACCO SCREEN RCVD TLK: CPT | Performed by: NURSE PRACTITIONER

## 2019-12-17 PROCEDURE — 96160 PT-FOCUSED HLTH RISK ASSMT: CPT | Performed by: NURSE PRACTITIONER

## 2019-12-17 PROCEDURE — G8419 CALC BMI OUT NRM PARAM NOF/U: HCPCS | Performed by: NURSE PRACTITIONER

## 2019-12-17 PROCEDURE — 90732 PPSV23 VACC 2 YRS+ SUBQ/IM: CPT | Performed by: NURSE PRACTITIONER

## 2019-12-17 PROCEDURE — 90686 IIV4 VACC NO PRSV 0.5 ML IM: CPT | Performed by: NURSE PRACTITIONER

## 2019-12-17 RX ORDER — BUPROPION HYDROCHLORIDE 300 MG/1
300 TABLET ORAL EVERY MORNING
Qty: 30 TABLET | Refills: 3 | Status: SHIPPED | OUTPATIENT
Start: 2019-12-17 | End: 2020-05-26

## 2019-12-17 RX ORDER — VARENICLINE TARTRATE 0.5 MG/1
.5-1 TABLET, FILM COATED ORAL SEE ADMIN INSTRUCTIONS
Qty: 57 TABLET | Refills: 0 | Status: SHIPPED
Start: 2019-12-17 | End: 2020-07-13

## 2019-12-17 RX ORDER — PANTOPRAZOLE SODIUM 40 MG/1
40 TABLET, DELAYED RELEASE ORAL DAILY
Qty: 30 TABLET | Refills: 11 | Status: SHIPPED | OUTPATIENT
Start: 2019-12-17

## 2019-12-17 RX ORDER — IBUPROFEN 800 MG/1
800 TABLET ORAL EVERY 6 HOURS PRN
Qty: 120 TABLET | Refills: 3 | Status: SHIPPED | OUTPATIENT
Start: 2019-12-17 | End: 2020-05-26 | Stop reason: SDUPTHER

## 2019-12-17 RX ORDER — LISINOPRIL 10 MG/1
15 TABLET ORAL DAILY
Qty: 30 TABLET | Refills: 5 | Status: SHIPPED | OUTPATIENT
Start: 2019-12-17 | End: 2020-09-03 | Stop reason: SDUPTHER

## 2019-12-17 SDOH — ECONOMIC STABILITY: INCOME INSECURITY: HOW HARD IS IT FOR YOU TO PAY FOR THE VERY BASICS LIKE FOOD, HOUSING, MEDICAL CARE, AND HEATING?: SOMEWHAT HARD

## 2019-12-17 SDOH — ECONOMIC STABILITY: TRANSPORTATION INSECURITY
IN THE PAST 12 MONTHS, HAS LACK OF TRANSPORTATION KEPT YOU FROM MEETINGS, WORK, OR FROM GETTING THINGS NEEDED FOR DAILY LIVING?: NO

## 2019-12-17 SDOH — ECONOMIC STABILITY: FOOD INSECURITY: WITHIN THE PAST 12 MONTHS, THE FOOD YOU BOUGHT JUST DIDN'T LAST AND YOU DIDN'T HAVE MONEY TO GET MORE.: NEVER TRUE

## 2019-12-17 SDOH — ECONOMIC STABILITY: TRANSPORTATION INSECURITY
IN THE PAST 12 MONTHS, HAS THE LACK OF TRANSPORTATION KEPT YOU FROM MEDICAL APPOINTMENTS OR FROM GETTING MEDICATIONS?: NO

## 2019-12-17 SDOH — ECONOMIC STABILITY: FOOD INSECURITY: WITHIN THE PAST 12 MONTHS, YOU WORRIED THAT YOUR FOOD WOULD RUN OUT BEFORE YOU GOT MONEY TO BUY MORE.: NEVER TRUE

## 2019-12-17 ASSESSMENT — PATIENT HEALTH QUESTIONNAIRE - PHQ9
1. LITTLE INTEREST OR PLEASURE IN DOING THINGS: 3
4. FEELING TIRED OR HAVING LITTLE ENERGY: 3
3. TROUBLE FALLING OR STAYING ASLEEP: 3
6. FEELING BAD ABOUT YOURSELF - OR THAT YOU ARE A FAILURE OR HAVE LET YOURSELF OR YOUR FAMILY DOWN: 2
10. IF YOU CHECKED OFF ANY PROBLEMS, HOW DIFFICULT HAVE THESE PROBLEMS MADE IT FOR YOU TO DO YOUR WORK, TAKE CARE OF THINGS AT HOME, OR GET ALONG WITH OTHER PEOPLE: 2
7. TROUBLE CONCENTRATING ON THINGS, SUCH AS READING THE NEWSPAPER OR WATCHING TELEVISION: 3
SUM OF ALL RESPONSES TO PHQ QUESTIONS 1-9: 17
2. FEELING DOWN, DEPRESSED OR HOPELESS: 1
9. THOUGHTS THAT YOU WOULD BE BETTER OFF DEAD, OR OF HURTING YOURSELF: 0
SUM OF ALL RESPONSES TO PHQ QUESTIONS 1-9: 17
SUM OF ALL RESPONSES TO PHQ9 QUESTIONS 1 & 2: 4
5. POOR APPETITE OR OVEREATING: 1
8. MOVING OR SPEAKING SO SLOWLY THAT OTHER PEOPLE COULD HAVE NOTICED. OR THE OPPOSITE, BEING SO FIGETY OR RESTLESS THAT YOU HAVE BEEN MOVING AROUND A LOT MORE THAN USUAL: 1

## 2019-12-17 ASSESSMENT — ENCOUNTER SYMPTOMS
ABDOMINAL PAIN: 1
NAUSEA: 0
CONSTIPATION: 0
DIARRHEA: 0
VOMITING: 0

## 2020-01-17 RX ORDER — GABAPENTIN 300 MG/1
300 CAPSULE ORAL 3 TIMES DAILY
Qty: 90 CAPSULE | Refills: 0 | Status: SHIPPED | OUTPATIENT
Start: 2020-01-17 | End: 2020-03-12

## 2020-01-17 RX ORDER — MIRTAZAPINE 15 MG/1
TABLET, FILM COATED ORAL
Qty: 30 TABLET | Refills: 0 | Status: SHIPPED | OUTPATIENT
Start: 2020-01-17 | End: 2020-03-12

## 2020-01-17 RX ORDER — ROPINIROLE 1 MG/1
TABLET, FILM COATED ORAL
Qty: 30 TABLET | Refills: 0 | Status: SHIPPED | OUTPATIENT
Start: 2020-01-17 | End: 2020-03-12

## 2020-02-06 RX ORDER — CYCLOBENZAPRINE HCL 10 MG
TABLET ORAL
Qty: 90 TABLET | Refills: 1 | Status: SHIPPED | OUTPATIENT
Start: 2020-02-06 | End: 2020-06-04

## 2020-03-03 ENCOUNTER — TELEPHONE (OUTPATIENT)
Dept: FAMILY MEDICINE CLINIC | Age: 45
End: 2020-03-03

## 2020-03-12 RX ORDER — MIRTAZAPINE 15 MG/1
TABLET, FILM COATED ORAL
Qty: 30 TABLET | Refills: 0 | Status: SHIPPED | OUTPATIENT
Start: 2020-03-12 | End: 2020-04-27

## 2020-03-12 RX ORDER — ROPINIROLE 1 MG/1
TABLET, FILM COATED ORAL
Qty: 30 TABLET | Refills: 0 | Status: SHIPPED | OUTPATIENT
Start: 2020-03-12 | End: 2020-04-27

## 2020-03-12 RX ORDER — GABAPENTIN 300 MG/1
CAPSULE ORAL
Qty: 90 CAPSULE | Refills: 0 | Status: SHIPPED | OUTPATIENT
Start: 2020-03-12 | End: 2020-04-27

## 2020-04-23 RX ORDER — CYCLOBENZAPRINE HCL 10 MG
TABLET ORAL
Qty: 90 TABLET | Refills: 1 | OUTPATIENT
Start: 2020-04-23

## 2020-04-27 RX ORDER — MIRTAZAPINE 15 MG/1
TABLET, FILM COATED ORAL
Qty: 30 TABLET | Refills: 0 | Status: SHIPPED | OUTPATIENT
Start: 2020-04-27 | End: 2020-05-26

## 2020-04-27 RX ORDER — ROPINIROLE 1 MG/1
TABLET, FILM COATED ORAL
Qty: 30 TABLET | Refills: 0 | Status: SHIPPED | OUTPATIENT
Start: 2020-04-27 | End: 2020-05-26

## 2020-04-27 RX ORDER — GABAPENTIN 300 MG/1
CAPSULE ORAL
Qty: 90 CAPSULE | Refills: 0 | Status: SHIPPED | OUTPATIENT
Start: 2020-04-27 | End: 2020-05-26

## 2020-05-26 RX ORDER — BUPROPION HYDROCHLORIDE 300 MG/1
TABLET ORAL
Qty: 30 TABLET | Refills: 3 | Status: SHIPPED | OUTPATIENT
Start: 2020-05-26 | End: 2020-10-01

## 2020-05-26 RX ORDER — MIRTAZAPINE 15 MG/1
TABLET, FILM COATED ORAL
Qty: 30 TABLET | Refills: 0 | Status: SHIPPED | OUTPATIENT
Start: 2020-05-26 | End: 2020-08-06

## 2020-05-26 RX ORDER — ROPINIROLE 1 MG/1
TABLET, FILM COATED ORAL
Qty: 30 TABLET | Refills: 0 | Status: SHIPPED | OUTPATIENT
Start: 2020-05-26

## 2020-05-26 RX ORDER — IBUPROFEN 800 MG/1
800 TABLET ORAL EVERY 6 HOURS PRN
Qty: 120 TABLET | Refills: 3 | Status: SHIPPED | OUTPATIENT
Start: 2020-05-26 | End: 2020-10-01

## 2020-05-26 RX ORDER — HYDROCODONE BITARTRATE AND ACETAMINOPHEN 5; 325 MG/1; MG/1
1 TABLET ORAL EVERY 6 HOURS PRN
Status: CANCELLED | OUTPATIENT
Start: 2020-05-26

## 2020-05-26 RX ORDER — GABAPENTIN 300 MG/1
CAPSULE ORAL
Qty: 90 CAPSULE | Refills: 0 | Status: SHIPPED
Start: 2020-05-26 | End: 2020-07-13 | Stop reason: DRUGHIGH

## 2020-05-26 NOTE — TELEPHONE ENCOUNTER
Colin Logan called requesting a refill of the below medication which has been pended for you:     Requested Prescriptions     Pending Prescriptions Disp Refills    gabapentin (NEURONTIN) 300 MG capsule [Pharmacy Med Name: GABAPENTIN 300MG CAPS] 90 capsule 0     Sig: TAKE 1 CAPSULE BY MOUTH THREE TIMES A DAY    mirtazapine (REMERON) 15 MG tablet [Pharmacy Med Name: MIRTAZAPINE 15MG TABS] 30 tablet 0     Sig: TAKE 1 TABLET BY MOUTH EVERY EVENING    buPROPion (WELLBUTRIN XL) 300 MG extended release tablet [Pharmacy Med Name: BUPROPION HCL ER (XL) 300MG TB24] 30 tablet 3     Sig: TAKE ONE TABLET BY MOUTH EVERY MORNING    rOPINIRole (REQUIP) 1 MG tablet [Pharmacy Med Name: ROPINIROLE HCL 1MG TABS] 30 tablet 0     Sig: TAKE 1 TABLET BY MOUTH EVERY NIGHT AT BEDTIME       Last Appointment Date: 12/17/2019  Next Appointment Date: 6/18/2020    No Known Allergies

## 2020-05-27 RX ORDER — ACETAMINOPHEN AND CODEINE PHOSPHATE 300; 30 MG/1; MG/1
TABLET ORAL
Qty: 3 TABLET | Refills: 0 | OUTPATIENT
Start: 2020-05-27

## 2020-06-04 RX ORDER — CYCLOBENZAPRINE HCL 10 MG
TABLET ORAL
Qty: 90 TABLET | Refills: 0 | Status: SHIPPED | OUTPATIENT
Start: 2020-06-04 | End: 2020-07-13 | Stop reason: SDUPTHER

## 2020-07-07 ENCOUNTER — HOSPITAL ENCOUNTER (OUTPATIENT)
Dept: LAB | Age: 45
Discharge: HOME | End: 2020-07-07
Payer: COMMERCIAL

## 2020-07-07 LAB
ABSOLUTE EOS #: 0.19 K/UL (ref 0–0.44)
ABSOLUTE IMMATURE GRANULOCYTE: 0.05 K/UL (ref 0–0.3)
ABSOLUTE LYMPH #: 3.15 K/UL (ref 1.1–3.7)
ABSOLUTE MONO #: 0.67 K/UL (ref 0.1–1.2)
ALBUMIN SERPL-MCNC: 4.5 G/DL (ref 3.5–5.2)
ALBUMIN/GLOBULIN RATIO: 1.5 (ref 1–2.5)
ALP BLD-CCNC: 95 U/L (ref 35–104)
ALT SERPL-CCNC: 25 U/L (ref 5–33)
ANION GAP SERPL CALCULATED.3IONS-SCNC: 14 MMOL/L (ref 9–17)
AST SERPL-CCNC: 16 U/L
BASOPHILS # BLD: 0 % (ref 0–2)
BASOPHILS ABSOLUTE: 0.04 K/UL (ref 0–0.2)
BILIRUB SERPL-MCNC: 0.13 MG/DL (ref 0.3–1.2)
BUN BLDV-MCNC: 16 MG/DL (ref 6–20)
BUN/CREAT BLD: 27 (ref 9–20)
CALCIUM SERPL-MCNC: 9.2 MG/DL (ref 8.6–10.4)
CHLORIDE BLD-SCNC: 100 MMOL/L (ref 98–107)
CHOLESTEROL/HDL RATIO: 4.8
CHOLESTEROL: 262 MG/DL
CO2: 27 MMOL/L (ref 20–31)
CREAT SERPL-MCNC: 0.59 MG/DL (ref 0.5–0.9)
DIFFERENTIAL TYPE: ABNORMAL
EOSINOPHILS RELATIVE PERCENT: 2 % (ref 1–4)
ESTIMATED AVERAGE GLUCOSE: 111 MG/DL
GFR AFRICAN AMERICAN: >60 ML/MIN
GFR NON-AFRICAN AMERICAN: >60 ML/MIN
GFR SERPL CREATININE-BSD FRML MDRD: ABNORMAL ML/MIN/{1.73_M2}
GFR SERPL CREATININE-BSD FRML MDRD: ABNORMAL ML/MIN/{1.73_M2}
GLUCOSE BLD-MCNC: 116 MG/DL (ref 70–99)
HBA1C MFR BLD: 5.5 % (ref 4.8–5.9)
HCT VFR BLD CALC: 41.1 % (ref 36.3–47.1)
HDLC SERPL-MCNC: 55 MG/DL
HEMOGLOBIN: 12.9 G/DL (ref 11.9–15.1)
IMMATURE GRANULOCYTES: 1 %
LDL CHOLESTEROL: 150 MG/DL (ref 0–130)
LYMPHOCYTES # BLD: 35 % (ref 24–43)
MCH RBC QN AUTO: 29.7 PG (ref 25.2–33.5)
MCHC RBC AUTO-ENTMCNC: 31.4 G/DL (ref 25.2–33.5)
MCV RBC AUTO: 94.7 FL (ref 82.6–102.9)
MONOCYTES # BLD: 8 % (ref 3–12)
NRBC AUTOMATED: 0 PER 100 WBC
PDW BLD-RTO: 13.6 % (ref 11.8–14.4)
PLATELET # BLD: 438 K/UL (ref 138–453)
PLATELET ESTIMATE: ABNORMAL
PMV BLD AUTO: 8.4 FL (ref 8.1–13.5)
POTASSIUM SERPL-SCNC: 4.2 MMOL/L (ref 3.7–5.3)
RBC # BLD: 4.34 M/UL (ref 3.95–5.11)
RBC # BLD: ABNORMAL 10*6/UL
SEG NEUTROPHILS: 54 % (ref 36–65)
SEGMENTED NEUTROPHILS ABSOLUTE COUNT: 4.87 K/UL (ref 1.5–8.1)
SODIUM BLD-SCNC: 141 MMOL/L (ref 135–144)
TOTAL PROTEIN: 7.6 G/DL (ref 6.4–8.3)
TRIGL SERPL-MCNC: 284 MG/DL
VLDLC SERPL CALC-MCNC: ABNORMAL MG/DL (ref 1–30)
WBC # BLD: 9 K/UL (ref 3.5–11.3)
WBC # BLD: ABNORMAL 10*3/UL

## 2020-07-07 PROCEDURE — 80061 LIPID PANEL: CPT

## 2020-07-07 PROCEDURE — 36415 COLL VENOUS BLD VENIPUNCTURE: CPT

## 2020-07-07 PROCEDURE — 83036 HEMOGLOBIN GLYCOSYLATED A1C: CPT

## 2020-07-07 PROCEDURE — 80053 COMPREHEN METABOLIC PANEL: CPT

## 2020-07-07 PROCEDURE — 85025 COMPLETE CBC W/AUTO DIFF WBC: CPT

## 2020-07-13 ENCOUNTER — HOSPITAL ENCOUNTER (OUTPATIENT)
Dept: GENERAL RADIOLOGY | Age: 45
Discharge: HOME | End: 2020-07-15
Payer: COMMERCIAL

## 2020-07-13 ENCOUNTER — OFFICE VISIT (OUTPATIENT)
Dept: FAMILY MEDICINE CLINIC | Age: 45
End: 2020-07-13
Payer: COMMERCIAL

## 2020-07-13 VITALS
HEIGHT: 64 IN | SYSTOLIC BLOOD PRESSURE: 138 MMHG | TEMPERATURE: 97.8 F | BODY MASS INDEX: 33.9 KG/M2 | RESPIRATION RATE: 16 BRPM | WEIGHT: 198.6 LBS | HEART RATE: 56 BPM | DIASTOLIC BLOOD PRESSURE: 88 MMHG | OXYGEN SATURATION: 98 %

## 2020-07-13 PROBLEM — G57.93 NEUROPATHIC PAIN OF BOTH LEGS: Status: ACTIVE | Noted: 2020-07-13

## 2020-07-13 PROCEDURE — 73080 X-RAY EXAM OF ELBOW: CPT

## 2020-07-13 PROCEDURE — G8417 CALC BMI ABV UP PARAM F/U: HCPCS | Performed by: NURSE PRACTITIONER

## 2020-07-13 PROCEDURE — 4004F PT TOBACCO SCREEN RCVD TLK: CPT | Performed by: NURSE PRACTITIONER

## 2020-07-13 PROCEDURE — G8427 DOCREV CUR MEDS BY ELIG CLIN: HCPCS | Performed by: NURSE PRACTITIONER

## 2020-07-13 PROCEDURE — 99214 OFFICE O/P EST MOD 30 MIN: CPT | Performed by: NURSE PRACTITIONER

## 2020-07-13 PROCEDURE — 96160 PT-FOCUSED HLTH RISK ASSMT: CPT | Performed by: NURSE PRACTITIONER

## 2020-07-13 PROCEDURE — 99215 OFFICE O/P EST HI 40 MIN: CPT | Performed by: NURSE PRACTITIONER

## 2020-07-13 RX ORDER — ATORVASTATIN CALCIUM 10 MG/1
10 TABLET, FILM COATED ORAL DAILY
Qty: 30 TABLET | Refills: 3 | Status: SHIPPED | OUTPATIENT
Start: 2020-07-13 | End: 2021-01-25 | Stop reason: SDUPTHER

## 2020-07-13 RX ORDER — BUSPIRONE HYDROCHLORIDE 5 MG/1
5 TABLET ORAL 2 TIMES DAILY
Qty: 60 TABLET | Refills: 0 | Status: SHIPPED | OUTPATIENT
Start: 2020-07-13 | End: 2020-08-10

## 2020-07-13 RX ORDER — GABAPENTIN 300 MG/1
300 CAPSULE ORAL 2 TIMES DAILY
Qty: 60 CAPSULE | Refills: 0 | Status: SHIPPED | OUTPATIENT
Start: 2020-07-13 | End: 2020-08-10

## 2020-07-13 RX ORDER — CYCLOBENZAPRINE HCL 10 MG
TABLET ORAL
Qty: 90 TABLET | Refills: 0 | Status: SHIPPED | OUTPATIENT
Start: 2020-07-13 | End: 2020-08-10

## 2020-07-13 RX ORDER — PREDNISONE 20 MG/1
20 TABLET ORAL 2 TIMES DAILY
Qty: 10 TABLET | Refills: 0 | Status: SHIPPED | OUTPATIENT
Start: 2020-07-13 | End: 2020-07-18

## 2020-07-13 RX ORDER — GABAPENTIN 300 MG/1
CAPSULE ORAL
Qty: 90 CAPSULE | Refills: 0 | Status: CANCELLED | OUTPATIENT
Start: 2020-07-13 | End: 2020-08-12

## 2020-07-13 RX ORDER — PRAZOSIN HYDROCHLORIDE 1 MG/1
1 CAPSULE ORAL NIGHTLY
Qty: 30 CAPSULE | Refills: 5 | Status: SHIPPED | OUTPATIENT
Start: 2020-07-13 | End: 2022-06-08 | Stop reason: SDUPTHER

## 2020-07-13 ASSESSMENT — PATIENT HEALTH QUESTIONNAIRE - PHQ9
10. IF YOU CHECKED OFF ANY PROBLEMS, HOW DIFFICULT HAVE THESE PROBLEMS MADE IT FOR YOU TO DO YOUR WORK, TAKE CARE OF THINGS AT HOME, OR GET ALONG WITH OTHER PEOPLE: 2
1. LITTLE INTEREST OR PLEASURE IN DOING THINGS: 3
6. FEELING BAD ABOUT YOURSELF - OR THAT YOU ARE A FAILURE OR HAVE LET YOURSELF OR YOUR FAMILY DOWN: 1
3. TROUBLE FALLING OR STAYING ASLEEP: 3
7. TROUBLE CONCENTRATING ON THINGS, SUCH AS READING THE NEWSPAPER OR WATCHING TELEVISION: 2
5. POOR APPETITE OR OVEREATING: 2
9. THOUGHTS THAT YOU WOULD BE BETTER OFF DEAD, OR OF HURTING YOURSELF: 0
SUM OF ALL RESPONSES TO PHQ9 QUESTIONS 1 & 2: 6
2. FEELING DOWN, DEPRESSED OR HOPELESS: 3
SUM OF ALL RESPONSES TO PHQ QUESTIONS 1-9: 20
8. MOVING OR SPEAKING SO SLOWLY THAT OTHER PEOPLE COULD HAVE NOTICED. OR THE OPPOSITE, BEING SO FIGETY OR RESTLESS THAT YOU HAVE BEEN MOVING AROUND A LOT MORE THAN USUAL: 3
SUM OF ALL RESPONSES TO PHQ QUESTIONS 1-9: 20
4. FEELING TIRED OR HAVING LITTLE ENERGY: 3

## 2020-07-13 ASSESSMENT — ENCOUNTER SYMPTOMS
COUGH: 0
GASTROINTESTINAL NEGATIVE: 1
SHORTNESS OF BREATH: 0

## 2020-07-13 ASSESSMENT — COLUMBIA-SUICIDE SEVERITY RATING SCALE - C-SSRS
6. HAVE YOU EVER DONE ANYTHING, STARTED TO DO ANYTHING, OR PREPARED TO DO ANYTHING TO END YOUR LIFE?: NO
1. WITHIN THE PAST MONTH, HAVE YOU WISHED YOU WERE DEAD OR WISHED YOU COULD GO TO SLEEP AND NOT WAKE UP?: NO

## 2020-07-13 NOTE — PROGRESS NOTES
[facility]    Subjective:     Patient ID: Lata Esteves is a 40 y.o. y.o. female. Patient in for office for 6 month follow up on Anxiety and HTN. I have reviewed the patient's medical history in detail and updated the computerized patient record. She has several concerns today. She had her labs completed and we will review as well. She continues on wellbutrin for anxiety and does well on this medication. She states that she does have times when she feels on edge. She used to take buspar as well. And would like to get back on this. She has history GERD and is on Protonix. She does well on this medication. She takes Neurontin for neuropathy of bilat legs. Controlled Substances Monitoring: Attestation: The Prescription Monitoring Report for this patient was reviewed today. (ANTONIA Deal)  Last refill was in May so she is not taking as prescribed. She has had some recent weight gain. She is requesting diet pills. I advised that we do not offer those but could give referral to [facility]. She has tried walking without much effect. She is also having left elbow pain today. She states that she hit it on the corner of her counter 2 weeks ago. She states that she has issues holding her coffee cup. Her index and middle finger are numb at time. She is right handed. She states that she has concerns with sleep apnea. She states that she at times catches herself not breathing. She states that her  has also witnessed apnea as well. She does admit to daytime fatigue and sleepiness. She is worried about sleep apnea. Hypertension   This is a chronic problem. The current episode started more than 1 year ago. The problem is unchanged. The problem is controlled (she takes lisinopril, she sometimes forgets, she checks this every day). Pertinent negatives include no chest pain, headaches, palpitations or shortness of breath. There are no associated agents to hypertension.  Past treatments include nothing. Compliance problems include diet and exercise (she has had weight gain). Hyperlipidemia   This is a chronic problem. The problem is uncontrolled. Recent lipid tests were reviewed and are high. Exacerbating diseases include obesity. There are no known factors aggravating her hyperlipidemia. Associated symptoms include myalgias (left elbow pain after injury). Pertinent negatives include no chest pain or shortness of breath. Risk factors for coronary artery disease include obesity and dyslipidemia. Past Medical History:   Diagnosis Date    Anxiety     Chronic back pain     Depression     Hypertension     Neuropathy        Past Surgical History:   Procedure Laterality Date    HYSTERECTOMY, TOTAL ABDOMINAL      WISDOM TOOTH EXTRACTION         Family History   Problem Relation Age of Onset    Heart Disease Father     Heart Attack Sister 44        No Known Allergies    Current Outpatient Medications   Medication Sig Dispense Refill    prazosin (MINIPRESS) 1 MG capsule Take 1 capsule by mouth nightly 30 capsule 5    cyclobenzaprine (FLEXERIL) 10 MG tablet TAKE 1 TABLET BY MOUTH 3 TIMES A DAY AS NEEDED FOR MUSCLE SPASMS 90 tablet 0    busPIRone (BUSPAR) 5 MG tablet Take 1 tablet by mouth 2 times daily 60 tablet 0    gabapentin (NEURONTIN) 300 MG capsule Take 1 capsule by mouth 2 times daily for 30 days.  60 capsule 0    atorvastatin (LIPITOR) 10 MG tablet Take 1 tablet by mouth daily 30 tablet 3    predniSONE (DELTASONE) 20 MG tablet Take 1 tablet by mouth 2 times daily for 5 days 10 tablet 0    mirtazapine (REMERON) 15 MG tablet TAKE 1 TABLET BY MOUTH EVERY EVENING 30 tablet 0    buPROPion (WELLBUTRIN XL) 300 MG extended release tablet TAKE ONE TABLET BY MOUTH EVERY MORNING 30 tablet 3    rOPINIRole (REQUIP) 1 MG tablet TAKE 1 TABLET BY MOUTH EVERY NIGHT AT BEDTIME 30 tablet 0    ibuprofen (ADVIL;MOTRIN) 800 MG tablet Take 1 tablet by mouth every 6 hours as needed for Pain 120 tablet 3    lisinopril (PRINIVIL;ZESTRIL) 10 MG tablet Take 1.5 tablets by mouth daily 30 tablet 5    pantoprazole (PROTONIX) 40 MG tablet Take 1 tablet by mouth daily 30 tablet 11    fluticasone (FLONASE) 50 MCG/ACT nasal spray 1 spray by Nasal route daily 1 Bottle 0     No current facility-administered medications for this visit. Review of Systems   Constitutional: Negative. Negative for activity change, appetite change and fever. Respiratory: Negative for cough and shortness of breath. Cardiovascular: Negative. Negative for chest pain, palpitations and leg swelling. Gastrointestinal: Negative. Musculoskeletal: Positive for myalgias (left elbow pain after injury). Neurological: Negative for headaches. Psychiatric/Behavioral: Positive for agitation (at times, used to take Buspar) and sleep disturbance (apnea witnessed). Negative for dysphoric mood. The patient is not nervous/anxious. Objective:       /88 (Site: Right Upper Arm, Position: Sitting, Cuff Size: Medium Adult)   Pulse 56   Temp 97.8 °F (36.6 °C) (Tympanic)   Resp 16   Ht 5' 4\" (1.626 m)   Wt 198 lb 9.6 oz (90.1 kg)   SpO2 98%   Breastfeeding No   BMI 34.09 kg/m²     Physical Exam  Vitals signs and nursing note reviewed. Constitutional:       Appearance: Normal appearance. She is well-developed. HENT:      Head: Normocephalic and atraumatic. Right Ear: External ear normal.      Left Ear: External ear normal.      Nose: Nose normal.   Eyes:      Conjunctiva/sclera: Conjunctivae normal.      Pupils: Pupils are equal, round, and reactive to light. Neck:      Musculoskeletal: Normal range of motion. Cardiovascular:      Rate and Rhythm: Normal rate and regular rhythm. Pulmonary:      Effort: Pulmonary effort is normal.      Breath sounds: Normal breath sounds. Abdominal:      General: Bowel sounds are normal.      Palpations: Abdomen is soft.    Musculoskeletal: Normal range of 07/07/2020 12.9  11.9 - 15.1 g/dL Final    Hematocrit 07/07/2020 41.1  36.3 - 47.1 % Final    MCV 07/07/2020 94.7  82.6 - 102.9 fL Final    MCH 07/07/2020 29.7  25.2 - 33.5 pg Final    MCHC 07/07/2020 31.4  25.2 - 33.5 g/dL Final    RDW 07/07/2020 13.6  11.8 - 14.4 % Final    Platelets 80/76/9753 438  138 - 453 k/uL Final    MPV 07/07/2020 8.4  8.1 - 13.5 fL Final    NRBC Automated 07/07/2020 0.0  0.0 per 100 WBC Final    Differential Type 07/07/2020 NOT REPORTED   Final    Seg Neutrophils 07/07/2020 54  36 - 65 % Final    Lymphocytes 07/07/2020 35  24 - 43 % Final    Monocytes 07/07/2020 8  3 - 12 % Final    Eosinophils % 07/07/2020 2  1 - 4 % Final    Basophils 07/07/2020 0  0 - 2 % Final    Immature Granulocytes 07/07/2020 1* 0 % Final    Segs Absolute 07/07/2020 4.87  1.50 - 8.10 k/uL Final    Absolute Lymph # 07/07/2020 3.15  1.10 - 3.70 k/uL Final    Absolute Mono # 07/07/2020 0.67  0.10 - 1.20 k/uL Final    Absolute Eos # 07/07/2020 0.19  0.00 - 0.44 k/uL Final    Basophils Absolute 07/07/2020 0.04  0.00 - 0.20 k/uL Final    Absolute Immature Granulocyte 07/07/2020 0.05  0.00 - 0.30 k/uL Final    WBC Morphology 07/07/2020 NOT REPORTED   Final    RBC Morphology 07/07/2020 NOT REPORTED   Final    Platelet Estimate 98/32/5546 NOT REPORTED   Final    Cholesterol 07/07/2020 262* <200 mg/dL Final    Comment:    Cholesterol Guidelines:      <200  Desirable   200-240  Borderline      >240  Undesirable         HDL 07/07/2020 55  >40 mg/dL Final    Comment:    HDL Guidelines:    <40     Undesirable   40-59    Borderline    >59     Desirable         LDL Cholesterol 07/07/2020 150* 0 - 130 mg/dL Final    Comment:    LDL Guidelines:     <100    Desirable   100-129   Near to/above Desirable   130-159   Borderline      >159   Undesirable     Direct (measured) LDL and calculated LDL are not interchangeable tests.       Chol/HDL Ratio 07/07/2020 4.8  <5 Final            Triglycerides 07/07/2020 284* <150 mg/dL Final    Comment:    Triglyceride Guidelines:     <150   Desirable   150-199  Borderline   200-499  High     >499   Very high   Based on AHA Guidelines for fasting triglyceride, October 2012.  VLDL 07/07/2020 NOT REPORTED* 1 - 30 mg/dL Final    Hemoglobin A1C 07/07/2020 5.5  4.8 - 5.9 % Final    Estimated Avg Glucose 07/07/2020 111  mg/dL Final         Assessment & Plan:      1. Hypertension, unspecified type-chronic stable  Cont on medication    2. Neuropathic pain of both legs-chronic stable    - gabapentin (NEURONTIN) 300 MG capsule; Take 1 capsule by mouth 2 times daily for 30 days. Dispense: 60 capsule; Refill: 0    3. Depression, unspecified depression type-chronic worsening  Follow up in 4 weeks. - busPIRone (BUSPAR) 5 MG tablet; Take 1 tablet by mouth 2 times daily  Dispense: 60 tablet; Refill: 0    4. Restless leg syndrome-chronic stable on medicaiton    - prazosin (MINIPRESS) 1 MG capsule; Take 1 capsule by mouth nightly  Dispense: 30 capsule; Refill: 5    5. Daytime sleepiness-new    - [address]  - Baseline Diagnostic Sleep Study; Future    6. Witnessed apneic spells-new      7. Class 1 obesity due to excess calories with serious comorbidity and body mass index (BMI) of 34.0 to 34.9 in adult-[facility]    - [facility] Bariatric Surgery    8. Mixed hyperlipidemia-new    - Lipid Panel; Future  - atorvastatin (LIPITOR) 10 MG tablet; Take 1 tablet by mouth daily  Dispense: 30 tablet; Refill: 3    9. Pain in left elbow-new  Will call with results. May need to go to PT.   - XR ELBOW LEFT (2 VIEWS); Future  - predniSONE (DELTASONE) 20 MG tablet; Take 1 tablet by mouth 2 times daily for 5 days  Dispense: 10 tablet; Refill: 0    Will call with all testing and treat if needed. Answered all of the patient's questions. Agrees with plan of care. Follow up in 6 months or sooner if needed.        TAMARA Arizmendi - CNP   7/13/2020 12:20 PM EDT

## 2020-08-04 ENCOUNTER — HOSPITAL ENCOUNTER (OUTPATIENT)
Age: 45
Setting detail: SPECIMEN
Discharge: HOME | End: 2020-08-04
Payer: COMMERCIAL

## 2020-08-04 ENCOUNTER — TELEPHONE (OUTPATIENT)
Dept: FAMILY MEDICINE CLINIC | Age: 45
End: 2020-08-04

## 2020-08-04 ENCOUNTER — OFFICE VISIT (OUTPATIENT)
Dept: OBGYN | Age: 45
End: 2020-08-04
Payer: COMMERCIAL

## 2020-08-04 VITALS
BODY MASS INDEX: 35.34 KG/M2 | WEIGHT: 207 LBS | HEART RATE: 88 BPM | HEIGHT: 64 IN | RESPIRATION RATE: 20 BRPM | TEMPERATURE: 98.6 F | SYSTOLIC BLOOD PRESSURE: 138 MMHG | DIASTOLIC BLOOD PRESSURE: 88 MMHG

## 2020-08-04 PROCEDURE — 99396 PREV VISIT EST AGE 40-64: CPT

## 2020-08-04 PROCEDURE — 99396 PREV VISIT EST AGE 40-64: CPT | Performed by: OBSTETRICS & GYNECOLOGY

## 2020-08-04 PROCEDURE — 87070 CULTURE OTHR SPECIMN AEROBIC: CPT

## 2020-08-04 PROCEDURE — 86403 PARTICLE AGGLUT ANTBDY SCRN: CPT

## 2020-08-04 PROCEDURE — G0145 SCR C/V CYTO,THINLAYER,RESCR: HCPCS

## 2020-08-04 RX ORDER — CLINDAMYCIN PHOSPHATE 20 MG/G
CREAM VAGINAL
Qty: 1 TUBE | Refills: 0 | Status: SHIPPED | OUTPATIENT
Start: 2020-08-04

## 2020-08-04 NOTE — PROGRESS NOTES
Lata Esteves  8/4/2020              40 y.o. Chief Complaint   Patient presents with   [facility] Gynecologic Exam     vaginal odor and needs pap         No LMP recorded. Patient has had a hysterectomy. Primary Care Physician: TAMARA Damon CNP    The patient was seen and examined. She is here for her annual exam.  Her bowels are regular. There are no voiding complaints. She denies any bloating. She denies vaginal discharge and was counseled on STD's and family planning. Performs Monthly Self Breast Exam: yes  Exercise: yes  C/o hot flashes and night sweets, been on Estrogen tabs , doing well but now out of it and want refill  C/o also vaginal discharge, no itching     HPI : Lata Esteves is a 40 y.o. female No obstetric history on file. Gynecologic Exam   This is a new problem. The current episode started 1 to 4 weeks ago. The problem occurs intermittently. The problem has been waxing and waning. Nothing aggravates the symptoms. She has tried nothing for the symptoms. The treatment provided no relief. Review of Systems   Genitourinary: Positive for pelvic pain and vaginal discharge. All other systems reviewed and are negative. Physical Exam  Vitals signs and nursing note reviewed. Exam conducted with a chaperone present. Constitutional:       General: She is not in acute distress. Appearance: Normal appearance. She is obese. Eyes:      Conjunctiva/sclera: Conjunctivae normal.      Pupils: Pupils are equal, round, and reactive to light. Neck:      Musculoskeletal: Normal range of motion and neck supple. Pulmonary:      Effort: Pulmonary effort is normal. No respiratory distress. Chest:      Chest wall: No mass, swelling, tenderness or edema. Breasts:         Right: No swelling, bleeding, mass, nipple discharge, skin change or tenderness. Left: Normal. No swelling, bleeding, mass, nipple discharge, skin change or tenderness.    Abdominal: Palpations: Abdomen is soft. There is no mass. Tenderness: There is no abdominal tenderness. There is left CVA tenderness. There is no guarding. Genitourinary:     General: Normal vulva. Vagina: Vaginal discharge present. Musculoskeletal: Normal range of motion. General: No deformity. Lymphadenopathy:      Upper Body:      Right upper body: No supraclavicular or axillary adenopathy. Left upper body: No supraclavicular or axillary adenopathy. Skin:     General: Skin is warm. Neurological:      General: No focal deficit present. Mental Status: She is alert and oriented to person, place, and time. Psychiatric:         Mood and Affect: Mood normal.         Behavior: Behavior normal.         ________________________________________________________________________  OB History   No obstetric history on file.      Past Medical History:   Diagnosis Date    Anxiety     Chronic back pain     Depression     Hypertension     Neuropathy                                                                    Past Surgical History:   Procedure Laterality Date    HYSTERECTOMY, TOTAL ABDOMINAL      WISDOM TOOTH EXTRACTION       Family History   Problem Relation Age of Onset    Heart Disease Father     Heart Attack Sister 44     Social History     Socioeconomic History    Marital status:      Spouse name: Reji Vaughn    Number of children: 11    Years of education: 15    Highest education level: High school graduate   Occupational History    Not on file   Social Needs    Financial resource strain: Somewhat hard    Food insecurity     Worry: Never true     Inability: Never true    Transportation needs     Medical: No     Non-medical: No   Tobacco Use    Smoking status: Current Every Day Smoker     Packs/day: 0.50     Years: 30.00     Pack years: 15.00     Types: Cigarettes    Smokeless tobacco: Never Used    Tobacco comment: cut down to 4 cigarettes a day   Substance and Sexual Activity    Alcohol use: Yes     Frequency: Never     Comment: socially    Drug use: Never    Sexual activity: Yes     Partners: Male     Comment: with  of 14 years   Lifestyle    Physical activity     Days per week: Not on file     Minutes per session: Not on file    Stress: Not on file   Relationships    Social connections     Talks on phone: Not on file     Gets together: Not on file     Attends Christianity service: Not on file     Active member of club or organization: Not on file     Attends meetings of clubs or organizations: Not on file     Relationship status: Not on file    Intimate partner violence     Fear of current or ex partner: Not on file     Emotionally abused: Not on file     Physically abused: Not on file     Forced sexual activity: Not on file   Other Topics Concern    Not on file   Social History Narrative    Not on file         MEDICATIONS:  Current Outpatient Medications on File Prior to Visit   Medication Sig Dispense Refill    prazosin (MINIPRESS) 1 MG capsule Take 1 capsule by mouth nightly 30 capsule 5    cyclobenzaprine (FLEXERIL) 10 MG tablet TAKE 1 TABLET BY MOUTH 3 TIMES A DAY AS NEEDED FOR MUSCLE SPASMS 90 tablet 0    busPIRone (BUSPAR) 5 MG tablet Take 1 tablet by mouth 2 times daily 60 tablet 0    gabapentin (NEURONTIN) 300 MG capsule Take 1 capsule by mouth 2 times daily for 30 days.  60 capsule 0    atorvastatin (LIPITOR) 10 MG tablet Take 1 tablet by mouth daily 30 tablet 3    mirtazapine (REMERON) 15 MG tablet TAKE 1 TABLET BY MOUTH EVERY EVENING 30 tablet 0    buPROPion (WELLBUTRIN XL) 300 MG extended release tablet TAKE ONE TABLET BY MOUTH EVERY MORNING 30 tablet 3    rOPINIRole (REQUIP) 1 MG tablet TAKE 1 TABLET BY MOUTH EVERY NIGHT AT BEDTIME 30 tablet 0    pantoprazole (PROTONIX) 40 MG tablet Take 1 tablet by mouth daily 30 tablet 11    fluticasone (FLONASE) 50 MCG/ACT nasal spray 1 spray by Nasal route daily 1 Bottle 0    ibuprofen (ADVIL;MOTRIN) 800 MG tablet Take 1 tablet by mouth every 6 hours as needed for Pain 120 tablet 3    lisinopril (PRINIVIL;ZESTRIL) 10 MG tablet Take 1.5 tablets by mouth daily 30 tablet 5     No current facility-administered medications on file prior to visit. Vital Signs:  /88 (Site: Right Upper Arm, Position: Sitting, Cuff Size: Medium Adult)   Pulse 88   Temp 98.6 °F (37 °C) (Temporal)   Resp 20   Ht 5' 4\" (1.626 m)   Wt 207 lb (93.9 kg)   BMI 35.53 kg/m²       ALLERGIES:  Allergies as of 08/04/2020    (No Known Allergies)       Immunization status: up to date and documented. Gynecologic History:  Menarche: 15 yo    Menopause at 2012 had hysterectomy      No LMP recorded. Patient has had a hysterectomy. Sexually Active: Yes    STD History: No     Permanent Sterilization: Yes , hysterectomy 2012     Reversible Birth Control: No        Hormone Replacement Exposure: Yes ,premarin 0.625 mg daily       Family History of Breast, Ovarian , Colon or Uterine Cancer: No       Preventative Health Testing:  Date of Last Pap Smear: 2019, normal   Abnormal Pap Smear History: no  Colposcopy History: none       ASSESSMENT:      40 y.o. Annual   Diagnosis Orders   1. Menopausal and female climacteric states     2. Menopausal symptoms     3. History of hysterectomy  US PELVIS COMPLETE   4. Vaginal discharge  Culture, Genital   5. Pelvic pain  US PELVIS COMPLETE   6. Pap smear for cervical cancer screening  PAP SMEAR   7. Abdominal pain, unspecified abdominal location  US ABDOMEN COMPLETE   8. Encounter for screening mammogram for breast cancer  PEDRO DIGITAL SCREEN W OR WO CAD BILATERAL                     Patient Active Problem List   Diagnosis    Hypertension    Depression    Primary insomnia    Restless leg syndrome    Neuropathic pain of both legs          Hereditary Breast, Ovarian, Colon and Uterine Cancer screening Done.           Tobacco & Secondary smoke risks reviewed; instructed on cessation and avoidance      Counseling Completed:  Done,  all the risks of HRT discussed with the patient will need to get a mammogram first before refilling her prescription. PLAN:  No follow-ups on file. Repeat pap every 1 year if negative. Mammograms every 1 year if 35 yo and last mammogram was negative. Supplements reviewd  Colonoscopy screening reviewed as well as onset for bone density testing. STD counseling and prevention reviewed.   Routine health maintenance per patients PCP  Pelvic ultrasound for the pelvic pain as well as a kidney ultrasound because of her tenderness in the left costophrenic angle    Zay Marsh MD

## 2020-08-05 ENCOUNTER — APPOINTMENT (OUTPATIENT)
Dept: ULTRASOUND IMAGING | Age: 45
End: 2020-08-05
Payer: COMMERCIAL

## 2020-08-05 ENCOUNTER — TELEPHONE (OUTPATIENT)
Dept: ULTRASOUND IMAGING | Age: 45
End: 2020-08-05

## 2020-08-05 ENCOUNTER — HOSPITAL ENCOUNTER (OUTPATIENT)
Dept: ULTRASOUND IMAGING | Age: 45
Discharge: HOME | End: 2020-08-07
Payer: COMMERCIAL

## 2020-08-05 ENCOUNTER — HOSPITAL ENCOUNTER (OUTPATIENT)
Dept: OCCUPATIONAL THERAPY | Age: 45
Setting detail: THERAPIES SERIES
Discharge: HOME | End: 2020-08-05
Payer: COMMERCIAL

## 2020-08-05 DIAGNOSIS — N94.6 MENORRHALGIA: Primary | ICD-10-CM

## 2020-08-05 PROCEDURE — 76775 US EXAM ABDO BACK WALL LIM: CPT

## 2020-08-05 PROCEDURE — 76830 TRANSVAGINAL US NON-OB: CPT

## 2020-08-05 PROCEDURE — 76856 US EXAM PELVIC COMPLETE: CPT

## 2020-08-05 NOTE — PROGRESS NOTES
Outpatient Occupational Therapy    [x] [place]  Phone: [phone]  Fax: [phone]      [] [place]  Phone: [phone]  Fax: [phone]    Occupational Therapy  Cancellation/No-show Note  Patient Name:  Vanessa Morales   :  1975   Date:  2020  Cancelled visits to date:   No-shows to date: 0    For today's appointment patient:  [x]    Cancelled  []    Rescheduled appointment  []    No-show     Reason given by patient:  []    Patient ill  []    Conflicting appointment  []    No transportation    []    Conflict with work  []    No reason given  [x]    Other:   Patient came to appointment and stated that she did not want to do therapy right now.    Comments:      Electronically signed by:  Janey Nickerson OT

## 2020-08-06 ENCOUNTER — OFFICE VISIT (OUTPATIENT)
Dept: CARDIOLOGY | Age: 45
End: 2020-08-06
Payer: COMMERCIAL

## 2020-08-06 ENCOUNTER — TELEPHONE (OUTPATIENT)
Dept: BARIATRICS/WEIGHT MGMT | Age: 45
End: 2020-08-06

## 2020-08-06 VITALS
HEIGHT: 64 IN | WEIGHT: 208 LBS | DIASTOLIC BLOOD PRESSURE: 82 MMHG | SYSTOLIC BLOOD PRESSURE: 150 MMHG | HEART RATE: 100 BPM | BODY MASS INDEX: 35.51 KG/M2

## 2020-08-06 PROBLEM — E66.01 MORBIDLY OBESE (HCC): Status: ACTIVE | Noted: 2020-08-06

## 2020-08-06 LAB — CYTOLOGY REPORT: NORMAL

## 2020-08-06 PROCEDURE — G8417 CALC BMI ABV UP PARAM F/U: HCPCS | Performed by: INTERNAL MEDICINE

## 2020-08-06 PROCEDURE — 93005 ELECTROCARDIOGRAM TRACING: CPT | Performed by: INTERNAL MEDICINE

## 2020-08-06 PROCEDURE — 99214 OFFICE O/P EST MOD 30 MIN: CPT

## 2020-08-06 PROCEDURE — 99244 OFF/OP CNSLTJ NEW/EST MOD 40: CPT | Performed by: INTERNAL MEDICINE

## 2020-08-06 PROCEDURE — 93010 ELECTROCARDIOGRAM REPORT: CPT | Performed by: INTERNAL MEDICINE

## 2020-08-06 PROCEDURE — G8427 DOCREV CUR MEDS BY ELIG CLIN: HCPCS | Performed by: INTERNAL MEDICINE

## 2020-08-06 RX ORDER — MIRTAZAPINE 15 MG/1
TABLET, FILM COATED ORAL
Qty: 30 TABLET | Refills: 3 | Status: SHIPPED | OUTPATIENT
Start: 2020-08-06 | End: 2020-12-05 | Stop reason: SDUPTHER

## 2020-08-06 RX ORDER — HYDROCHLOROTHIAZIDE 12.5 MG/1
12.5 TABLET ORAL EVERY MORNING
Qty: 30 TABLET | Refills: 5 | Status: SHIPPED | OUTPATIENT
Start: 2020-08-06 | End: 2021-02-11

## 2020-08-06 SDOH — HEALTH STABILITY: MENTAL HEALTH: HOW MANY STANDARD DRINKS CONTAINING ALCOHOL DO YOU HAVE ON A TYPICAL DAY?: 1 OR 2

## 2020-08-06 SDOH — HEALTH STABILITY: MENTAL HEALTH: HOW OFTEN DO YOU HAVE A DRINK CONTAINING ALCOHOL?: MONTHLY OR LESS

## 2020-08-06 NOTE — PROGRESS NOTES
daily 19  Yes Axel Polanco MD   clindamycin (CLEOCIN) 2 % vaginal cream Place vaginally nightly for 7 nights  Patient not taking: Reported on 2020   Ingris Baca MD   mirtazapine (REMERON) 15 MG tablet TAKE 1 TABLET BY MOUTH EVERY EVENING 20   TAMARA Deal CNP   ibuprofen (ADVIL;MOTRIN) 800 MG tablet Take 1 tablet by mouth every 6 hours as needed for Pain 20  TAMARA Mosqueda CNP   lisinopril (PRINIVIL;ZESTRIL) 10 MG tablet Take 1.5 tablets by mouth daily 19  TAMARA Deal CNP       Allergies:  Patient has no known allergies. Social History:   reports that she has been smoking cigarettes. She has a 7.50 pack-year smoking history. She has never used smokeless tobacco. She reports current alcohol use. She reports that she does not use drugs. Family History:    Family History   Problem Relation Age of Onset    Crohn's Disease Mother     Hypertension Mother     Heart Disease Father     Heart Surgery Father         CABG x 4 vessel    Hypertension Father     Heart Attack Sister         asystole, pacemaker, age 45 yrs   Byrd Hypertension Sister     Heart Failure Brother         age 36 yrs   Byrd Hypertension Brother     Heart Disease Maternal Grandmother          at age 58 yr    Heart Disease Paternal Grandmother          at age 61 yrs   Byrd Heart Disease Paternal Grandfather          at age 71 yrs       REVIEW OF SYSTEMS:  CONSTITUTIONAL:NEGATIVE  HEENT:NEG  Cardiovascular: Yes chest pain, Yes dyspnea on exertion, Yes palpitations. Lower extremity edema: No  RESPIRATORY: LUQUE  GASTROINTESTINAL:  negative  GENITOURINARY:  negative  INTEGUMENT:  negative  MUSCULOSKELETAL:  positive for  pain  NEUROLOGICAL:  negative    PHYSICAL EXAM:      BP (!) 150/82 (Site: Right Upper Arm, Position: Sitting)   Pulse 100   Ht 5' 4\" (1.626 m)   Wt 208 lb (94.3 kg)   BMI 35.70 kg/m²    HEENT: PERRL, no cervical lymphadenopathy.  No

## 2020-08-06 NOTE — TELEPHONE ENCOUNTER
Patient notified that the weight loss seminar with Dr. Lauren Mazariegos on 9/3/2020 is canceled because of COVID-19. Patient instructed on how to do the online session. Patient verbalized understanding.

## 2020-08-06 NOTE — PATIENT INSTRUCTIONS
Nuclear Stress Test      Please allow 3 hours to complete this test.    Central Scheduling will call you to schedule this test. If you do not receive a call within 48 hours, please call to schedule at [phone]. Please report to Physical Therapy in the BAS[facility] of [facility].     >>  Nothing to eat or drink except water for FOUR (4) HOURS prior to arrival time. >>  No caffeine for 24 hours prior to test. This includes decaffeinated beverages,  chocolate, tea and cola drinks. >>  If you are diabetic, check with your Primary Care Provider regarding changes in your insulin or diabetic pills on test day.    >>  No smoking for 12 hours before the test.     >> Take regular medications.     >> Hold the following medications prior to day of stress test:     >>  If you are walking on the treadmill, wear comfortable clothes and shoes. Please do not apply lotion or oils to skin on the day of stress test.     +++ Pregnancy +++   If you are a woman of child bearing age, you will need to complete a blood test prior to your stress test.   Please notify the nurse if you think you might be pregnant.

## 2020-08-06 NOTE — TELEPHONE ENCOUNTER
Last Appt:  7/13/2020 (6mo)  Next Appt:   Six month follow up not yet scheduled     Med verified in Epic

## 2020-08-07 LAB
CULTURE: ABNORMAL
Lab: ABNORMAL
SPECIMEN DESCRIPTION: ABNORMAL

## 2020-08-07 RX ORDER — BUSPIRONE HYDROCHLORIDE 5 MG/1
TABLET ORAL
Qty: 60 TABLET | Refills: 0 | OUTPATIENT
Start: 2020-08-07

## 2020-08-07 RX ORDER — CYCLOBENZAPRINE HCL 10 MG
TABLET ORAL
Qty: 90 TABLET | Refills: 0 | OUTPATIENT
Start: 2020-08-07

## 2020-08-07 RX ORDER — GABAPENTIN 300 MG/1
CAPSULE ORAL
Qty: 60 CAPSULE | Refills: 0 | OUTPATIENT
Start: 2020-08-07

## 2020-08-07 NOTE — TELEPHONE ENCOUNTER
Pt should be set on refills until 8/12/2020 for Gabapentin and Flexeril. Pt should be good on Buspar until 8/25/2020.

## 2020-08-10 RX ORDER — GABAPENTIN 300 MG/1
CAPSULE ORAL
Qty: 60 CAPSULE | Refills: 0 | Status: SHIPPED | OUTPATIENT
Start: 2020-08-13 | End: 2020-09-11

## 2020-08-10 RX ORDER — CYCLOBENZAPRINE HCL 10 MG
TABLET ORAL
Qty: 90 TABLET | Refills: 0 | Status: SHIPPED | OUTPATIENT
Start: 2020-08-10 | End: 2020-10-08

## 2020-08-10 RX ORDER — BUSPIRONE HYDROCHLORIDE 5 MG/1
TABLET ORAL
Qty: 60 TABLET | Refills: 0 | Status: SHIPPED
Start: 2020-08-10 | End: 2020-09-03 | Stop reason: DRUGHIGH

## 2020-08-10 NOTE — TELEPHONE ENCOUNTER
Pt should be set on refills until 8/12/2020 for Gabapentin and Flexeril. Pt should be good on Buspar until 8/25/2020.          Last Appt:  7/13/2020  Next Appt:   8/21/2020  '  Med contract signed on 7/13/2020  Last Fill: 7/13/2020 #60 for 30      Med verified in [address]

## 2020-08-17 ENCOUNTER — HOSPITAL ENCOUNTER (OUTPATIENT)
Dept: NON INVASIVE DIAGNOSTICS | Age: 45
Discharge: HOME | End: 2020-08-17
Payer: COMMERCIAL

## 2020-08-17 ENCOUNTER — HOSPITAL ENCOUNTER (OUTPATIENT)
Dept: NUCLEAR MEDICINE | Age: 45
Discharge: HOME | End: 2020-08-19
Payer: COMMERCIAL

## 2020-08-17 ENCOUNTER — TELEPHONE (OUTPATIENT)
Dept: CARDIOLOGY | Age: 45
End: 2020-08-17

## 2020-08-17 LAB
LV EF: 43 %
LVEF MODALITY: NORMAL

## 2020-08-17 PROCEDURE — A9500 TC99M SESTAMIBI: HCPCS | Performed by: INTERNAL MEDICINE

## 2020-08-17 PROCEDURE — 78452 HT MUSCLE IMAGE SPECT MULT: CPT

## 2020-08-17 PROCEDURE — 93306 TTE W/DOPPLER COMPLETE: CPT

## 2020-08-17 PROCEDURE — 93018 CV STRESS TEST I&R ONLY: CPT | Performed by: INTERNAL MEDICINE

## 2020-08-17 PROCEDURE — 6360000002 HC RX W HCPCS: Performed by: INTERNAL MEDICINE

## 2020-08-17 PROCEDURE — 93017 CV STRESS TEST TRACING ONLY: CPT

## 2020-08-17 PROCEDURE — 3430000000 HC RX DIAGNOSTIC RADIOPHARMACEUTICAL: Performed by: INTERNAL MEDICINE

## 2020-08-17 RX ADMIN — TETRAKIS(2-METHOXYISOBUTYLISOCYANIDE)COPPER(I) TETRAFLUOROBORATE 30 MILLICURIE: 1 INJECTION, POWDER, LYOPHILIZED, FOR SOLUTION INTRAVENOUS at 10:17

## 2020-08-17 RX ADMIN — REGADENOSON 0.4 MG: 0.08 INJECTION, SOLUTION INTRAVENOUS at 09:22

## 2020-08-17 RX ADMIN — TETRAKIS(2-METHOXYISOBUTYLISOCYANIDE)COPPER(I) TETRAFLUOROBORATE 10 MILLICURIE: 1 INJECTION, POWDER, LYOPHILIZED, FOR SOLUTION INTRAVENOUS at 10:16

## 2020-08-17 NOTE — PROGRESS NOTES
Patient Name:  Vahid Shabazz MRN:  [id number]   :  1975  Age:  40 y.o.  Sex: female   Ordering Provider: Toshia Hughes MD   Referring Provider: TAMARA Nelson CNP  Primary Care Provider:  TAMARA Nelson CNP     Indications: Chest Pain     Medications:     Current Outpatient Medications:     cyclobenzaprine (FLEXERIL) 10 MG tablet, TAKE 1 TABLET BY MOUTH 3 TIMES A DAY AS NEEDED FOR MUSCLE SPASMS, Disp: 90 tablet, Rfl: 0    gabapentin (NEURONTIN) 300 MG capsule, TAKE 1 CAPSULE BY MOUTH 2 TIMES A DAY FOR 30 DAYS, Disp: 60 capsule, Rfl: 0    busPIRone (BUSPAR) 5 MG tablet, TAKE 1 TABLET BY MOUTH 2 TIMES A DAY, Disp: 60 tablet, Rfl: 0    hydroCHLOROthiazide (HYDRODIURIL) 12.5 MG tablet, Take 1 tablet by mouth every morning, Disp: 30 tablet, Rfl: 5    mirtazapine (REMERON) 15 MG tablet, TAKE ONE TABLET BY MOUTH EVERY EVENING, Disp: 30 tablet, Rfl: 3    estrogens, conjugated, (PREMARIN) 0.625 MG tablet, Take 1 tablet by mouth daily, Disp: 90 tablet, Rfl: 3    clindamycin (CLEOCIN) 2 % vaginal cream, Place vaginally nightly for 7 nights (Patient not taking: Reported on 2020), Disp: 1 Tube, Rfl: 0    prazosin (MINIPRESS) 1 MG capsule, Take 1 capsule by mouth nightly, Disp: 30 capsule, Rfl: 5    atorvastatin (LIPITOR) 10 MG tablet, Take 1 tablet by mouth daily, Disp: 30 tablet, Rfl: 3    buPROPion (WELLBUTRIN XL) 300 MG extended release tablet, TAKE ONE TABLET BY MOUTH EVERY MORNING, Disp: 30 tablet, Rfl: 3    rOPINIRole (REQUIP) 1 MG tablet, TAKE 1 TABLET BY MOUTH EVERY NIGHT AT BEDTIME, Disp: 30 tablet, Rfl: 0    ibuprofen (ADVIL;MOTRIN) 800 MG tablet, Take 1 tablet by mouth every 6 hours as needed for Pain, Disp: 120 tablet, Rfl: 3    lisinopril (PRINIVIL;ZESTRIL) 10 MG tablet, Take 1.5 tablets by mouth daily, Disp: 30 tablet, Rfl: 5    pantoprazole (PROTONIX) 40 MG tablet, Take 1 tablet by mouth daily, Disp: 30 tablet, Rfl: 11    fluticasone (FLONASE) 50 MCG/ACT nasal spray, 1 spray by Nasal route daily, Disp: 1 Bottle, Rfl: 0    Current Facility-Administered Medications:     regadenoson (LEXISCAN) injection 0.4 mg, 0.4 mg, Intravenous, Once, Freddie Muro DO    Facility-Administered Medications Ordered in Other Encounters:     technetium sestamibi (CARDIOLITE) injection 30 millicurie, 30 millicurie, Intravenous, ONCE PRFany STEVENSON MD    technetium sestamibi (CARDIOLITE) injection 10 millicurie, 10 millicurie, Intravenous, ONCE PRN, Fany Purvis MD      ----------------------------------------------------------------------------------------------------------                Lexiscan 0.4 mg injected over 10 seconds. IV Cardiolite injected 20 seconds post Lexiscan injection. Heart Rate:  91  Resting Blood Pressure:  164/90   ----------------------------------------------------------------------------------------------------------     HR BP   1 min 112 155/75   2 min     3 min 103 146/81   4 min     5 min 99 140/78   6 min     7 min 101 150/84   8 min     9 min 100 152/89   10 min       Symptoms:  Chest Pain:  Yes      Nausea:  Yes     Headache:  No    Shortness of Breath:  Yes     Other:  No      Electronically signed by Divya Arenas RN on 8/17/20 at 9:16 AM EDT  ----------------------------------------------------------------------------------------------------------  Resting EKG:  Sinus, LVH with repol, long QTC    Arrhythmias:  none     EKG Changes:  none     Interpretation:    - Negative ECG Portion of Lexiscan Stress Test  - Await Nuclear Portion    Supervising Physician:    Katie Pierce [facility]  [facility]  [phone]

## 2020-08-17 NOTE — PROCEDURES
[facility]                 [address] [place] [id number]                              CARDIAC STRESS TEST    PATIENT NAME: Jason Reyes                       :        1975  MED REC NO:   [id number]                             ROOM:  ACCOUNT NO:   [de-identified]                           ADMIT DATE: 2020  PROVIDER:     Nerissa Ramirez DO    DATE OF STUDY:  2020    STRESS TEST    Ordering Provider: Alison Velasco MD    Primary Care Provider: Rohit Mcginnis CNP    Patient's Age: 40     Height: 5 feet, 4 inches  Weight: 208 pounds    INDICATION:  Chest pain. Lexiscan 0.4 mg injected over 10 seconds. IV Cardiolite injected 20 seconds post Lexiscan injection. Heart Rate: 91 Resting blood pressure:  164/90              HR   BP  1 min          112  155/75  2 min  3 min          103  146/81  4 min  5 min          99   140/78  6 min  7 min          101  150/84  8 min  9 min          100  152/89  10 min    Symptoms:  Chest Pain: Yes  Nausea: Yes  Headache:  No  Shortness of breath: Yes  Other: No    Resting EKG:  Sinus, LVH with repolarization, long QTC. Arrhythmias: None. EKG changes:  None. INTERPRETATION:  1. Negative ECG portion of Lexiscan Stress Test.  2. Await nuclear portion. Nuclear Myocardial Perfusion Imaging (SPECT)    TESTING METHOD  STRESS:   Lexiscan  AGENT:    Cardiolite  REST:          Injection Date:  20  Time:  816  Amount:  13.25 mCi  STRESS:   Injection Date:  20  Time:  924  Amount:  38.7 mCi  IMAGE TIME:    Rest:  849  Stress:  1010    EF:  30%  TID:  0.98  LHR:  0.64    FINDINGS:  Ischemia (Reversible Defect):           Yes  Infarction (Irreversible Defect):       No  Ejection fraction Calculated:           Reduced  Segmental wall motion:                  Abnormal    IMPRESSION:  1. High risk study. 2. Basal and mid anterior wall reversible ischemia.   3. Global hypokinesis with anterior dyskinesis. 4. Ejection fraction 30%.           MEAGHAN NOLEN DO    D: 08/17/2020 14:28:47       T: 08/17/2020 14:31:56     /JOSE JUAN_RAMESH  Job#: [id number]     Doc#: [id number]    CC:  Nasir Love

## 2020-08-17 NOTE — TELEPHONE ENCOUNTER
Left third message stating pt should call us tomorrow between 8-430p and go to ER in meantime for any urgent cardiac symptoms.

## 2020-08-20 ENCOUNTER — HOSPITAL ENCOUNTER (EMERGENCY)
Age: 45
Discharge: HOME | End: 2020-08-20
Attending: EMERGENCY MEDICINE
Payer: COMMERCIAL

## 2020-08-20 ENCOUNTER — APPOINTMENT (OUTPATIENT)
Dept: CT IMAGING | Age: 45
End: 2020-08-20
Payer: COMMERCIAL

## 2020-08-20 VITALS
WEIGHT: 200 LBS | OXYGEN SATURATION: 99 % | TEMPERATURE: 97.9 F | SYSTOLIC BLOOD PRESSURE: 136 MMHG | HEART RATE: 100 BPM | HEIGHT: 64 IN | RESPIRATION RATE: 16 BRPM | BODY MASS INDEX: 34.15 KG/M2 | DIASTOLIC BLOOD PRESSURE: 90 MMHG

## 2020-08-20 LAB
-: ABNORMAL
ABSOLUTE EOS #: 0.07 K/UL (ref 0–0.44)
ABSOLUTE IMMATURE GRANULOCYTE: 0.06 K/UL (ref 0–0.3)
ABSOLUTE LYMPH #: 2.96 K/UL (ref 1.1–3.7)
ABSOLUTE MONO #: 0.85 K/UL (ref 0.1–1.2)
ALBUMIN SERPL-MCNC: 4.2 G/DL (ref 3.5–5.2)
ALBUMIN/GLOBULIN RATIO: 1.4 (ref 1–2.5)
ALP BLD-CCNC: 83 U/L (ref 35–104)
ALT SERPL-CCNC: 37 U/L (ref 5–33)
AMORPHOUS: ABNORMAL
AMYLASE: 35 U/L (ref 28–100)
ANION GAP SERPL CALCULATED.3IONS-SCNC: 13 MMOL/L (ref 9–17)
AST SERPL-CCNC: 24 U/L
BACTERIA: ABNORMAL
BASOPHILS # BLD: 0 % (ref 0–2)
BASOPHILS ABSOLUTE: 0.05 K/UL (ref 0–0.2)
BILIRUB SERPL-MCNC: 0.16 MG/DL (ref 0.3–1.2)
BILIRUBIN DIRECT: <0.08 MG/DL
BILIRUBIN URINE: NEGATIVE
BILIRUBIN, INDIRECT: ABNORMAL MG/DL (ref 0–1)
BUN BLDV-MCNC: 15 MG/DL (ref 6–20)
BUN/CREAT BLD: 22 (ref 9–20)
CALCIUM SERPL-MCNC: 8.9 MG/DL (ref 8.6–10.4)
CASTS UA: ABNORMAL /LPF (ref 0–2)
CHLORIDE BLD-SCNC: 102 MMOL/L (ref 98–107)
CO2: 26 MMOL/L (ref 20–31)
COLOR: NORMAL
COMMENT UA: NORMAL
CREAT SERPL-MCNC: 0.67 MG/DL (ref 0.5–0.9)
CRYSTALS, UA: ABNORMAL /HPF
DIFFERENTIAL TYPE: ABNORMAL
EKG ATRIAL RATE: 111 BPM
EKG P AXIS: 59 DEGREES
EKG P-R INTERVAL: 128 MS
EKG Q-T INTERVAL: 362 MS
EKG QRS DURATION: 82 MS
EKG QTC CALCULATION (BAZETT): 492 MS
EKG R AXIS: 18 DEGREES
EKG T AXIS: 27 DEGREES
EKG VENTRICULAR RATE: 111 BPM
EOSINOPHILS RELATIVE PERCENT: 1 % (ref 1–4)
EPITHELIAL CELLS UA: ABNORMAL /HPF (ref 0–5)
GFR AFRICAN AMERICAN: >60 ML/MIN
GFR NON-AFRICAN AMERICAN: >60 ML/MIN
GFR SERPL CREATININE-BSD FRML MDRD: ABNORMAL ML/MIN/{1.73_M2}
GFR SERPL CREATININE-BSD FRML MDRD: ABNORMAL ML/MIN/{1.73_M2}
GLOBULIN: 3.1 G/DL (ref 1.5–3.8)
GLUCOSE BLD-MCNC: 106 MG/DL (ref 70–99)
GLUCOSE URINE: NEGATIVE
HCT VFR BLD CALC: 36.7 % (ref 36.3–47.1)
HEMOGLOBIN: 12 G/DL (ref 11.9–15.1)
IMMATURE GRANULOCYTES: 1 %
KETONES, URINE: NEGATIVE
LACTIC ACID: 1.6 MMOL/L (ref 0.5–2.2)
LEUKOCYTE ESTERASE, URINE: NEGATIVE
LIPASE: 12 U/L (ref 13–60)
LYMPHOCYTES # BLD: 24 % (ref 24–43)
MCH RBC QN AUTO: 29.2 PG (ref 25.2–33.5)
MCHC RBC AUTO-ENTMCNC: 32.7 G/DL (ref 25.2–33.5)
MCV RBC AUTO: 89.3 FL (ref 82.6–102.9)
MONOCYTES # BLD: 7 % (ref 3–12)
MUCUS: ABNORMAL
NITRITE, URINE: NEGATIVE
NRBC AUTOMATED: 0 PER 100 WBC
OTHER OBSERVATIONS UA: ABNORMAL
PDW BLD-RTO: 14.2 % (ref 11.8–14.4)
PH UA: 6 (ref 5–6)
PLATELET # BLD: 470 K/UL (ref 138–453)
PLATELET ESTIMATE: ABNORMAL
PMV BLD AUTO: 8.8 FL (ref 8.1–13.5)
POTASSIUM SERPL-SCNC: 4 MMOL/L (ref 3.7–5.3)
PROTEIN UA: NEGATIVE
RBC # BLD: 4.11 M/UL (ref 3.95–5.11)
RBC # BLD: ABNORMAL 10*6/UL
RBC UA: ABNORMAL /HPF (ref 0–4)
RENAL EPITHELIAL, UA: ABNORMAL /HPF
SEG NEUTROPHILS: 67 % (ref 36–65)
SEGMENTED NEUTROPHILS ABSOLUTE COUNT: 8.16 K/UL (ref 1.5–8.1)
SODIUM BLD-SCNC: 141 MMOL/L (ref 135–144)
SPECIFIC GRAVITY UA: 1.02 (ref 1.01–1.02)
TOTAL PROTEIN: 7.3 G/DL (ref 6.4–8.3)
TRICHOMONAS: ABNORMAL
TROPONIN INTERP: NORMAL
TROPONIN T: NORMAL NG/ML
TROPONIN, HIGH SENSITIVITY: 12 NG/L (ref 0–14)
TURBIDITY: NORMAL
URINE HGB: NEGATIVE
UROBILINOGEN, URINE: NORMAL
WBC # BLD: 12.2 K/UL (ref 3.5–11.3)
WBC # BLD: ABNORMAL 10*3/UL
WBC UA: ABNORMAL /HPF (ref 0–4)
YEAST: ABNORMAL

## 2020-08-20 PROCEDURE — 2709999900 CT ABDOMEN PELVIS W IV CONTRAST

## 2020-08-20 PROCEDURE — 80076 HEPATIC FUNCTION PANEL: CPT

## 2020-08-20 PROCEDURE — 82150 ASSAY OF AMYLASE: CPT

## 2020-08-20 PROCEDURE — 96375 TX/PRO/DX INJ NEW DRUG ADDON: CPT

## 2020-08-20 PROCEDURE — 96374 THER/PROPH/DIAG INJ IV PUSH: CPT

## 2020-08-20 PROCEDURE — 96361 HYDRATE IV INFUSION ADD-ON: CPT

## 2020-08-20 PROCEDURE — 83690 ASSAY OF LIPASE: CPT

## 2020-08-20 PROCEDURE — 36415 COLL VENOUS BLD VENIPUNCTURE: CPT

## 2020-08-20 PROCEDURE — 99284 EMERGENCY DEPT VISIT MOD MDM: CPT

## 2020-08-20 PROCEDURE — 85025 COMPLETE CBC W/AUTO DIFF WBC: CPT

## 2020-08-20 PROCEDURE — 83605 ASSAY OF LACTIC ACID: CPT

## 2020-08-20 PROCEDURE — 93005 ELECTROCARDIOGRAM TRACING: CPT | Performed by: EMERGENCY MEDICINE

## 2020-08-20 PROCEDURE — 6360000004 HC RX CONTRAST MEDICATION: Performed by: EMERGENCY MEDICINE

## 2020-08-20 PROCEDURE — 84484 ASSAY OF TROPONIN QUANT: CPT

## 2020-08-20 PROCEDURE — 6360000002 HC RX W HCPCS: Performed by: EMERGENCY MEDICINE

## 2020-08-20 PROCEDURE — 81001 URINALYSIS AUTO W/SCOPE: CPT

## 2020-08-20 PROCEDURE — 2580000003 HC RX 258: Performed by: EMERGENCY MEDICINE

## 2020-08-20 PROCEDURE — C9113 INJ PANTOPRAZOLE SODIUM, VIA: HCPCS | Performed by: EMERGENCY MEDICINE

## 2020-08-20 PROCEDURE — 80048 BASIC METABOLIC PNL TOTAL CA: CPT

## 2020-08-20 RX ORDER — KETOROLAC TROMETHAMINE 30 MG/ML
15 INJECTION, SOLUTION INTRAMUSCULAR; INTRAVENOUS ONCE
Status: COMPLETED | OUTPATIENT
Start: 2020-08-20 | End: 2020-08-20

## 2020-08-20 RX ORDER — ONDANSETRON 4 MG/1
4 TABLET, FILM COATED ORAL EVERY 8 HOURS PRN
Qty: 20 TABLET | Refills: 0 | Status: SHIPPED | OUTPATIENT
Start: 2020-08-20 | End: 2021-02-11

## 2020-08-20 RX ORDER — 0.9 % SODIUM CHLORIDE 0.9 %
1000 INTRAVENOUS SOLUTION INTRAVENOUS ONCE
Status: COMPLETED | OUTPATIENT
Start: 2020-08-20 | End: 2020-08-20

## 2020-08-20 RX ORDER — ONDANSETRON 2 MG/ML
4 INJECTION INTRAMUSCULAR; INTRAVENOUS ONCE
Status: COMPLETED | OUTPATIENT
Start: 2020-08-20 | End: 2020-08-20

## 2020-08-20 RX ORDER — PANTOPRAZOLE SODIUM 40 MG/10ML
40 INJECTION, POWDER, LYOPHILIZED, FOR SOLUTION INTRAVENOUS ONCE
Status: COMPLETED | OUTPATIENT
Start: 2020-08-20 | End: 2020-08-20

## 2020-08-20 RX ADMIN — PANTOPRAZOLE SODIUM 40 MG: 40 INJECTION, POWDER, FOR SOLUTION INTRAVENOUS at 11:33

## 2020-08-20 RX ADMIN — SODIUM CHLORIDE 1000 ML: 9 INJECTION, SOLUTION INTRAVENOUS at 11:46

## 2020-08-20 RX ADMIN — ONDANSETRON 4 MG: 2 INJECTION INTRAMUSCULAR; INTRAVENOUS at 11:31

## 2020-08-20 RX ADMIN — IOPAMIDOL 100 ML: 755 INJECTION, SOLUTION INTRAVENOUS at 12:14

## 2020-08-20 RX ADMIN — KETOROLAC TROMETHAMINE 15 MG: 30 INJECTION, SOLUTION INTRAMUSCULAR at 11:31

## 2020-08-20 ASSESSMENT — PAIN DESCRIPTION - LOCATION: LOCATION: ABDOMEN

## 2020-08-20 ASSESSMENT — PAIN SCALES - GENERAL
PAINLEVEL_OUTOF10: 8
PAINLEVEL_OUTOF10: 6
PAINLEVEL_OUTOF10: 8

## 2020-08-20 ASSESSMENT — PAIN DESCRIPTION - PAIN TYPE: TYPE: ACUTE PAIN

## 2020-08-20 ASSESSMENT — PAIN - FUNCTIONAL ASSESSMENT: PAIN_FUNCTIONAL_ASSESSMENT: 0-10

## 2020-08-20 NOTE — ED PROVIDER NOTES
[facility] ED  [address]  [place] [address]  Phone: [phone]  EMERGENCY DEPARTMENT ENCOUNTER      Pt Name: Sabino Ahmadi  MRN: [id number]  Shantanugfgwen 1975  Date of evaluation: 8/20/2020    CHIEF COMPLAINT       Chief Complaint   Patient presents with    Emesis     since 6pm yesterday, about 6 episodes. last episode one hr ago, \"now just dry heaving I don't have anything left in my body. \"       HISTORY OF PRESENT ILLNESS    Sabino Ahmadi is a 40 y.o. female who presents to the emergency department with nausea vomiting and epigastric abdominal pain that started yesterday around 6:00 after she had homemade tacos. Nobody else sick at home. Denies fevers or chills no diarrhea or constipation. No recent travel or antibiotics she does work in a hospital setting. History of hysterectomy no other abdominal surgeries. Denies any urinary complaints. No chest pain or shortness of breath. She is scheduled for heart cath next week after an abnormal stress test.  She admits to about 6 or 7 episodes of vomiting last one was 1 hour prior to arrival.    REVIEW OF SYSTEMS       Constitutional: No fevers or chills   HEENT: No sore throat, rhinorrhea, or earache   Eyes: No blurry vision or double vision no drainage   Cardiovascular: No chest pain or tachycardia   Respiratory: No wheezing or shortness of breath no cough   Gastrointestinal: Positive nausea vomiting and abdominal pain no diarrhea or constipation. : No hematuria or dysuria   Musculoskeletal: No swelling or pain   Skin: No rash   Neurological: No focal neurologic complaints, paresthesias, weakness, or headache     PAST MEDICAL HISTORY    has a past medical history of Anxiety, Chronic back pain, Depression, Hypertension, and Neuropathy. SURGICAL HISTORY      has a past surgical history that includes Hysterectomy, total abdominal and [place] tooth extraction.     CURRENT MEDICATIONS       Previous Medications    ATORVASTATIN (LIPITOR) 10 MG TABLET    Take 1 tablet by mouth daily    BUPROPION (WELLBUTRIN XL) 300 MG EXTENDED RELEASE TABLET    TAKE ONE TABLET BY MOUTH EVERY MORNING    BUSPIRONE (BUSPAR) 5 MG TABLET    TAKE 1 TABLET BY MOUTH 2 TIMES A DAY    CLINDAMYCIN (CLEOCIN) 2 % VAGINAL CREAM    Place vaginally nightly for 7 nights    CYCLOBENZAPRINE (FLEXERIL) 10 MG TABLET    TAKE 1 TABLET BY MOUTH 3 TIMES A DAY AS NEEDED FOR MUSCLE SPASMS    ESTROGENS, CONJUGATED, (PREMARIN) 0.625 MG TABLET    Take 1 tablet by mouth daily    FLUTICASONE (FLONASE) 50 MCG/ACT NASAL SPRAY    1 spray by Nasal route daily    GABAPENTIN (NEURONTIN) 300 MG CAPSULE    TAKE 1 CAPSULE BY MOUTH 2 TIMES A DAY FOR 30 DAYS    HYDROCHLOROTHIAZIDE (HYDRODIURIL) 12.5 MG TABLET    Take 1 tablet by mouth every morning    IBUPROFEN (ADVIL;MOTRIN) 800 MG TABLET    Take 1 tablet by mouth every 6 hours as needed for Pain    LISINOPRIL (PRINIVIL;ZESTRIL) 10 MG TABLET    Take 1.5 tablets by mouth daily    MIRTAZAPINE (REMERON) 15 MG TABLET    TAKE ONE TABLET BY MOUTH EVERY EVENING    PANTOPRAZOLE (PROTONIX) 40 MG TABLET    Take 1 tablet by mouth daily    PRAZOSIN (MINIPRESS) 1 MG CAPSULE    Take 1 capsule by mouth nightly    ROPINIROLE (REQUIP) 1 MG TABLET    TAKE 1 TABLET BY MOUTH EVERY NIGHT AT BEDTIME       ALLERGIES     has No Known Allergies. FAMILY HISTORY     She indicated that her mother is alive. She indicated that her father is alive. She indicated that her sister is alive. She indicated that her brother is alive. She indicated that her maternal grandmother is . She indicated that her paternal grandmother is . She indicated that her paternal grandfather is . family history includes Crohn's Disease in her mother; Heart Attack in her sister;  Heart Disease in her father, maternal grandmother, paternal grandfather, and paternal grandmother; Heart Failure in her brother; Heart Surgery in her father; Hypertension in her brother, father, mother, and sister. SOCIAL HISTORY      reports that she has been smoking cigarettes. She has a 7.50 pack-year smoking history. She has never used smokeless tobacco. She reports current alcohol use. She reports that she does not use drugs. PHYSICAL EXAM       ED Triage Vitals   BP Temp Temp Source Pulse Resp SpO2 Height Weight   08/20/20 1057 08/20/20 1055 08/20/20 1055 08/20/20 1057 08/20/20 1057 08/20/20 1057 08/20/20 1055 08/20/20 1055   (!) 160/107 97.9 °F (36.6 °C) Tympanic 114 16 96 % 5' 4\" (1.626 m) 200 lb (90.7 kg)     Constitutional: Alert, oriented x3, nontoxic, answering questions appropriately, acting properly for age, in no acute distress   HEENT: Extraocular muscles intact, mucus membranes moist,  no posterior pharyngeal erythema or exudates, Pupils equal, round, reactive to light,   Neck: Trachea midline   Cardiovascular: Regular rhythm and tachycardic no S3, S4, or murmurs   Respiratory: Clear to auscultation bilaterally no wheezes, rhonchi, rales, no respiratory distress no tachypnea no retractions no hypoxia  Gastrointestinal: Soft, mild epigastric tenderness to palpation, mildly distended, diminished bowel sounds. No rebound, rigidity, or guarding. No abdominal bruit no pulsatile mass  Musculoskeletal: No extremity pain or swelling   Neurologic: Moving all 4 extremities without difficulty there are no gross focal neurologic deficits   Skin: Warm and dry     DIFFERENTIAL DIAGNOSIS/ MDM:     IV, fluids, Zofran and Toradol. CT abdomen and pelvis. Labs. Rule out cardiac etiology. DIAGNOSTIC RESULTS     EKG: All EKG's are interpreted by the Emergency Department Physician who either signs or Co-signs this chart in the absence of a cardiologist.    1132 sinus tachycardia rate 111.  QRS 82  no acute ST or T wave changes.     Not indicated unless otherwise documented above    LABS:  Results for orders placed or performed during the hospital encounter of 08/20/20   CBC Auto Differential 1.4 1.0 - 2.5   Troponin   Result Value Ref Range    Troponin, High Sensitivity 12 0 - 14 ng/L    Troponin T NOT REPORTED <0.03 ng/mL    Troponin Interp NOT REPORTED    Lactic Acid   Result Value Ref Range    Lactic Acid 1.6 0.5 - 2.2 mmol/L   Urinalysis Reflex to Culture    Specimen: Urine, clean catch   Result Value Ref Range    Color, UA NOT REPORTED YELLOW    Turbidity UA NOT REPORTED CLEAR    Glucose, Ur NEGATIVE NEGATIVE    Bilirubin Urine NEGATIVE NEGATIVE    Ketones, Urine NEGATIVE NEGATIVE    Specific Gravity, UA 1.025 1.010 - 1.025    Urine Hgb NEGATIVE NEGATIVE    pH, UA 6.0 5.0 - 6.0    Protein, UA NEGATIVE NEGATIVE    Urobilinogen, Urine Normal Normal    Nitrite, Urine NEGATIVE NEGATIVE    Leukocyte Esterase, Urine NEGATIVE NEGATIVE    Urinalysis Comments NOT REPORTED    Microscopic Urinalysis   Result Value Ref Range    -          WBC, UA 0 TO 4 0 - 4 /HPF    RBC, UA 0 TO 4 0 - 4 /HPF    Casts UA NOT REPORTED 0 - 2 /LPF    Crystals, UA NOT REPORTED None /HPF    Epithelial Cells UA 25 TO 50 0 - 5 /HPF    Renal Epithelial, UA NOT REPORTED 0 /HPF    Bacteria, UA 1+ (A) None    Mucus, UA NOT REPORTED None    Trichomonas, UA NOT REPORTED None    Amorphous, UA NOT REPORTED None    Other Observations UA NOT REPORTED NOT REQ. Yeast, UA NOT REPORTED None       Not indicated unless otherwise documented above    RADIOLOGY:   I reviewed the radiologist interpretations:    CT ABDOMEN PELVIS W IV CONTRAST Additional Contrast? None   Preliminary Result   No acute intra-abdominal findings. Mild hepatic steatosis. Increased colonic wall submucosal fat which can be seen with chronic colitis.              Not indicated unless otherwise documented above    EMERGENCY DEPARTMENT COURSE:     The patient was given the following medications:  Orders Placed This Encounter   Medications    0.9 % sodium chloride bolus    ondansetron (ZOFRAN) injection 4 mg    pantoprazole (PROTONIX) injection 40 mg    ketorolac (TORADOL) injection 15 mg    iopamidol (ISOVUE-370) 76 % injection 100 mL    ondansetron (ZOFRAN) 4 MG tablet     Sig: Take 1 tablet by mouth every 8 hours as needed for Nausea     Dispense:  20 tablet     Refill:  0        Vitals:   -------------------------  /79   Pulse 103   Temp 97.9 °F (36.6 °C) (Tympanic)   Resp 17   Ht 5' 4\" (1.626 m)   Wt 200 lb (90.7 kg)   SpO2 97%   BMI 34.33 kg/m²     1 PM nausea resolved receiving IV fluids. Labs unremarkable slightly elevated white blood cell count. Normal BUN and creatinine normal electrolytes. CT of the abdomen and pelvis negative for acute process. Will finish hydration and discharged home with Protonix and Zofran and follow-up with general surgery may need EGD. She is been having episodes like this in the past but has not followed up appropriately. Will discharge and return if worsening symptoms or other concerns. Nontoxic abdomen is soft nonsurgical.    I have reviewed the disposition diagnosis with the patient and or their family/guardian. I have answered their questions and given discharge instructions. They voiced understanding of these instructions and did not have any furtherquestions or complaints. CRITICAL CARE:    None    CONSULTS:    None    PROCEDURES:    None      OARRS Report if indicated             FINAL IMPRESSION      1. Non-intractable vomiting with nausea, unspecified vomiting type    2.  Epigastric pain          DISPOSITION/PLAN   DISPOSITION          CONDITION ON DISPOSITION: STABLE       PATIENT REFERRED TO:  TAMARA Munroe - [facility]  [phone]    Schedule an appointment as soon as possible for a visit in 1 week      Cesario Wyatt MD  [address] [place] [address] [address]  [phone]    Schedule an appointment as soon as possible for a visit in 3 days        DISCHARGE MEDICATIONS:  New Prescriptions    ONDANSETRON (ZOFRAN) 4 MG TABLET    Take 1 tablet by mouth every 8 hours as needed for Nausea       (Please note that portions of thisnote were completed with a voice recognition program.  Efforts were made to edit the dictations but occasionally words are mis-transcribed.)    Meghna Mercedes,, DO  Attending Emergency Physician        Meghna Mercedes,   08/20/20 3022

## 2020-08-24 ENCOUNTER — HOSPITAL ENCOUNTER (OUTPATIENT)
Dept: OCCUPATIONAL THERAPY | Age: 45
Setting detail: THERAPIES SERIES
Discharge: HOME | End: 2020-08-24
Payer: COMMERCIAL

## 2020-08-24 ENCOUNTER — TELEPHONE (OUTPATIENT)
Dept: SLEEP CENTER | Age: 45
End: 2020-08-24

## 2020-08-24 PROCEDURE — 97035 APP MDLTY 1+ULTRASOUND EA 15: CPT | Performed by: OCCUPATIONAL THERAPIST

## 2020-08-24 PROCEDURE — 97166 OT EVAL MOD COMPLEX 45 MIN: CPT | Performed by: OCCUPATIONAL THERAPIST

## 2020-08-24 PROCEDURE — 97110 THERAPEUTIC EXERCISES: CPT | Performed by: OCCUPATIONAL THERAPIST

## 2020-08-24 PROCEDURE — 97140 MANUAL THERAPY 1/> REGIONS: CPT | Performed by: OCCUPATIONAL THERAPIST

## 2020-08-24 ASSESSMENT — 9 HOLE PEG TEST
TESTTIME_SECONDS: 21
TEST_RESULT: IMPAIRED
TESTTIME_SECONDS: 18
TEST_RESULT: FUNCTIONAL

## 2020-08-24 ASSESSMENT — PAIN SCALES - GENERAL: PAINLEVEL_OUTOF10: 8

## 2020-08-24 ASSESSMENT — PAIN DESCRIPTION - ORIENTATION: ORIENTATION: LEFT

## 2020-08-24 ASSESSMENT — PAIN DESCRIPTION - PAIN TYPE: TYPE: CHRONIC PAIN

## 2020-08-24 ASSESSMENT — PAIN DESCRIPTION - LOCATION: LOCATION: ELBOW;ARM

## 2020-08-24 NOTE — PLAN OF CARE
Occupational Therapy    [x] [place]  Phone: [phone]  Fax: [phone]      [] [place]  Phone: [phone]  Fax: [phone]       To: Referring Practitioner: Miladis Aguayo      Patient: Vahid Shabazz  : 1975  MRN: [id number]  Evaluation Date: 2020      Diagnosis Information:  Diagnosis: Pain in left elbow   OT Insurance Information: [facility]     Occupational Therapy Certification/Re-Certification Form  Dear Han Boyd  The following patient has been evaluated for occupational therapy services and for therapy to continue, Insurance requires physician review of the treatment plan. Please review the attached evaluation and/or summary of the patient's plan of care, and verify that you agree therapy should continue by signing the attached document and sending it back to our office.     Plan of Care/Treatment to date:  2020-2020  [x] Therapeutic Exercise   [] Modalities:  [x] Therapeutic Activity    [x] Ultrasound  [x] Electrical Stimulation   [x] Activities of Daily Living    [] Paraffin   [x] Kinesiotaping  [] Neuromuscular Re-education   [x] Iontophoresis [x] Coldpack/hotpack   [x] Instruction in HEP     []   [x] Manual Therapy     Other:  [] Aquatic Therapy      []       Frequency/Duration:  # Days per week: [] 1 day # Weeks: [] 1 week [] 5 weeks      [x] 2-3 days   [] 2 weeks [] 6 weeks     [] 3 days   [] 3 weeks [] 7 weeks     [] 4 days   [x] 4 weeks [] 8 weeks  Goals:     Long term goals  Time Frame for Long term goals : 4 weeks  Long term goal 1: Patient to verbalize decreased pain LUE < 3/10 with movement and use  Long term goal 2: Patient to demonstrate increased strength BUE: L hand  > 30#, MMT overall L wrist and elbow 4+/10  Long term goal 3: Patient to demonstrate improved [address] with 9HPT L hand < 19s  Long term goal 4: Patient to demonstrate improved functional use LUE with upper extremity functional index > 45/80    Rehab Potential: [] excellent [x] good [] fair  [] poor     Electronically signed by:  Arely Robertson OT      If you have any questions or concerns, please don't hesitate to call.   Thank you for your referral.      Physician Signature:________________________________Date:__________________  By signing above, therapists plan is approved by physician

## 2020-08-24 NOTE — FLOWSHEET NOTE
[facility]    [] [place]  Phone: [phone]  Fax: [phone]      [] [place]  Phone: [phone]  Fax: [phone]    Occupational Therapy Daily Treatment Note  Date:  2020    Patient Name:  Tilman Osler    :  1975  MRN: [id number]  Restrictions/Precautions:      Medical/Treatment Diagnosis Information:   Diagnosis: Pain in left elbow   OT Insurance Information: [facility]  Insurance/Certification information:OT Insurance Information: [address] [place] [address]  Physician Information: Referring Practitioner: Norman Genera of care signed (Y/N):  n    Visit# / total visits:  1/    Pain level: 4/10 at rest, 8-910 with use and movement     Progress Note: [x]  Yes  []  No  Next due by: Visit #10      Date of evaluation/re-evaluation: 2020-2020    Time In: 110  Time Out:  200    Subjective:   See progress note      Objective/Assessment:   OT eval completed, see progress note  Myofascial release therapy and soft tissue mobilization LUE  US 8 min L elbow/farm   1:1 therapeutic exercise and ultrasound, see log    Exercises:   Exercise/Equipment Resistance/Repetitions Other comments   Prayer stretch Hold 10s       5x    Reverse prayer stretch Hold 10s       5x    Tendon glides 10x                                                            Therapeutic Exercise  [x] Provided verbal/tactile cueing for activities related to strengthening, flexibility, endurance, ROM. (45700)  Neuro  Re-Ed  [] Provided verbal/tactile cueing for activities related to improving balance, coordination, kinesthetic sense, posture, motor skill, proprioception. (07769)     Therapeutic Activities/ADL:   [] Provided use of dynamic activities to improve functional performance ()  [] Provided self-care/home management training for activities of daily living and compensatory training (88245)     Manual Treatments:   [x] Provided manual therapy to mobilize soft tissue/joints for the purpose of modulating pain, promoting relaxation, increasing ROM, reducing/eliminating soft tissue swelling/inflammation/restriction, improving soft tissue extensibility. (41597)     Orthotic Management:   [] Provided assessment and fitting orthotic device for improved functional performance.  (91872)    Service Based Modalities:      Timed Code Treatment Minutes:   8 US 10 manual therapy 12 therapeutic exercise    Total Treatment Minutes:   50    Treatment/Activity Tolerance:  [x] Patient tolerated treatment well [] Patient limited by fatique  [] Patient limited by pain  [] Patient limited by other medical complications  [] Other:     Prognosis: [x] Good [] Fair  [] Poor    Patient Requires Follow-up: [x] Yes  [] No    Goals:    Long term goals  Time Frame for Long term goals : 4 weeks  Long term goal 1: Patient to verbalize decreased pain LUE < 3/10 with movement and use  Long term goal 2: Patient to demonstrate increased strength BUE: L hand  > 30#, MMT overall L wrist and elbow 4+/10  Long term goal 3: Patient to demonstrate improved [facility] with 9HPT L hand < 19s  Long term goal 4: Patient to demonstrate improved functional use LUE with upper extremity functional index > 45/80    Plan:   [] Continue per plan of care [] Alter current plan (see comments)  [x] Plan of care initiated [] Hold pending MD visit [] Discharge    Plan for Next Session:      Electronically signed by:  Lavelle Head OT

## 2020-08-24 NOTE — PROGRESS NOTES
Occupational Therapy  Occupational Therapy Initial Assessment  Date:  2020    Patient Name: Sami Porter  MRN: [id number]     :  1975     Treatment Diagnosis: Left lateral epicondylitis    Subjective   General  Chart Reviewed: Yes  Patient assessed for rehabilitation services?: Yes  Family / Caregiver Present: No  Referring Practitioner: Michela Garcia  Diagnosis: Pain in left elbow  OT Visit Information  Onset Date: 20  OT Insurance Information: [employer]  Subjective  Subjective: Patient rec'd in waiting room, pleasant and cooperative 40 yr old female with L elbow pain  Pain Assessment  Pain Assessment: 0-10  Pain Level: 8(4/10 at rest, 8-9/10 with use and movement)  Pain Type: Chronic pain  Pain Location: Elbow;Arm  Pain Orientation: Left  Pain Descriptors: Burning;Pounding; Stabbing; Throbbing  Vital Signs  Patient Currently in Pain: Yes     Home Living  Social/Functional History  Lives With: Spouse, Family  Type of Home: House  ADL Assistance: Independent  Homemaking Assistance: Independent  Homemaking Responsibilities: Yes  Meal Prep Responsibility: Primary  Laundry Responsibility: Primary  Cleaning Responsibility: Primary  Shopping Responsibility: Primary  Ambulation Assistance: Independent  Transfer Assistance: Independent  Active : Yes  Occupation: Part time employment  Type of occupation: temperature check at [employer]      Objective   Cognition  Overall Cognitive Status: WFL     LUE AROM (degrees)  LUE AROM : WNL  L Forearm Supination  0-90: 72°  L Wrist Flexion 0-80: 80°  L Wrist Extension 0-70: 62°  L Wrist Radial Deviation 0-20: 12°  L Wrist Ulnar Deviation 0-45: 44°  Left Hand AROM (degrees)  Left Hand AROM: WNL  RUE AROM (degrees)  RUE AROM : WNL  Right Hand AROM (degrees)  Right Hand AROM: WNL  LUE Strength  Gross LUE Strength: WFL  L Shoulder Flex: 5/5  L Shoulder Ext: 5/5  L Shoulder Horiz ABduction: 5/5  L Shoulder Horiz ADduction: 5/5  L Elbow Flex: 3+/5  L Elbow Ext: 3/5  L Wrist Flex: 3+/5  L Wrist Ext: 3-/5  L Wrist Radial Deviation: 3-/5  L Wrist Ulnar Deviation: 3-/5  RUE Strength  Gross RUE Strength: WNL     Hand Dominance  Hand Dominance: Right  Left Hand Strength -  (lbs)  Handle Setting 2: 15   (WNL 62)  Left Hand Strength - Pinch (lbs)  Lateral: 12  Tip: 5  Palmar 3 point: 8  LUE Edema - Circumference (cm)  LUE Edema Present?: No  Left 9-Hole Peg Test  Left 9-Hole Peg Test: Impaired  Right Hand Strength -  (lbs)  Handle Setting 2: 57   (WNL 70)  Right Hand Strength - Pinch (lbs)  Lateral: 14  Tip: 7  Palmar 3 point: 16  RUE Edema - Circumference (cm)  RUE Edema Present?: No  Right 9-Hole Peg Test  Right 9-Hole Peg Test: Functional  Fine Motor Skills  Left 9-Hole Peg Test: Impaired  Left 9 Hole Peg Test Time (secs): 21((WNL 19s))  Right 9-Hole Peg Test: Functional  Right 9 Hole Peg Test Time (secs): 18  Other Assessment: UEFI 25/80  Hand Assessment Comment  Hand Assessment Comment: (+) long finger test L hand    Assessment   Assessment  Performance deficits / Impairments: Decreased ADL status; Decreased ROM; Decreased strength;Decreased high-level IADLs;Decreased fine motor control  Treatment Diagnosis: Left lateral epicondylitis  Prognosis: Good  Decision Making: Medium Complexity  REQUIRES OT FOLLOW UP: Yes       Plan    Plan of care initiated    Goals  Long term goals  Time Frame for Long term goals : 4 weeks  Long term goal 1: Patient to verbalize decreased pain LUE < 3/10 with movement and use  Long term goal 2: Patient to demonstrate increased strength BUE: L hand  > 30#, MMT overall L wrist and elbow 4+/10  Long term goal 3: Patient to demonstrate improved [facility] with 9HPT L hand < 19s  Long term goal 4: Patient to demonstrate improved functional use LUE with upper extremity functional index > 45/80       Therapy Time   Individual Concurrent Group Co-treatment   Time In 1310         Time Out 1400         Minutes 50         Timed Code Treatment Minutes: [address] [place] [address],

## 2020-08-25 ENCOUNTER — HOSPITAL ENCOUNTER (OUTPATIENT)
Dept: CARDIAC CATH/INVASIVE PROCEDURES | Age: 45
Discharge: HOME | End: 2020-08-25
Payer: COMMERCIAL

## 2020-08-25 VITALS
DIASTOLIC BLOOD PRESSURE: 92 MMHG | WEIGHT: 200 LBS | RESPIRATION RATE: 13 BRPM | SYSTOLIC BLOOD PRESSURE: 148 MMHG | TEMPERATURE: 98.4 F | HEIGHT: 66 IN | HEART RATE: 99 BPM | OXYGEN SATURATION: 98 % | BODY MASS INDEX: 32.14 KG/M2

## 2020-08-25 PROCEDURE — C1769 GUIDE WIRE: HCPCS

## 2020-08-25 PROCEDURE — 93458 L HRT ARTERY/VENTRICLE ANGIO: CPT | Performed by: INTERNAL MEDICINE

## 2020-08-25 PROCEDURE — 6360000002 HC RX W HCPCS

## 2020-08-25 PROCEDURE — C1894 INTRO/SHEATH, NON-LASER: HCPCS

## 2020-08-25 PROCEDURE — 93454 CORONARY ARTERY ANGIO S&I: CPT | Performed by: INTERNAL MEDICINE

## 2020-08-25 PROCEDURE — 6360000004 HC RX CONTRAST MEDICATION

## 2020-08-25 PROCEDURE — C1760 CLOSURE DEV, VASC: HCPCS

## 2020-08-25 PROCEDURE — 7100000001 HC PACU RECOVERY - ADDTL 15 MIN

## 2020-08-25 PROCEDURE — 7100000000 HC PACU RECOVERY - FIRST 15 MIN

## 2020-08-25 PROCEDURE — 2500000003 HC RX 250 WO HCPCS

## 2020-08-25 PROCEDURE — C1725 CATH, TRANSLUMIN NON-LASER: HCPCS

## 2020-08-25 PROCEDURE — 2709999900 HC NON-CHARGEABLE SUPPLY

## 2020-08-25 RX ORDER — SODIUM CHLORIDE 0.9 % (FLUSH) 0.9 %
10 SYRINGE (ML) INJECTION EVERY 12 HOURS SCHEDULED
Status: DISCONTINUED | OUTPATIENT
Start: 2020-08-25 | End: 2020-08-26 | Stop reason: HOSPADM

## 2020-08-25 RX ORDER — FENTANYL CITRATE 50 UG/ML
25 INJECTION, SOLUTION INTRAMUSCULAR; INTRAVENOUS ONCE
Status: COMPLETED | OUTPATIENT
Start: 2020-08-25 | End: 2020-08-25

## 2020-08-25 RX ORDER — SODIUM CHLORIDE 0.9 % (FLUSH) 0.9 %
10 SYRINGE (ML) INJECTION PRN
Status: DISCONTINUED | OUTPATIENT
Start: 2020-08-25 | End: 2020-08-26 | Stop reason: HOSPADM

## 2020-08-25 RX ORDER — SODIUM CHLORIDE 9 MG/ML
INJECTION, SOLUTION INTRAVENOUS CONTINUOUS
Status: DISCONTINUED | OUTPATIENT
Start: 2020-08-25 | End: 2020-08-26 | Stop reason: HOSPADM

## 2020-08-25 RX ADMIN — SODIUM CHLORIDE: 9 INJECTION, SOLUTION INTRAVENOUS at 13:29

## 2020-08-25 RX ADMIN — FENTANYL CITRATE 25 MCG: 50 INJECTION, SOLUTION INTRAMUSCULAR; INTRAVENOUS at 16:53

## 2020-08-25 NOTE — OP NOTE
[facility]        Date:   8/25/2020  Patient name:  Luis Velázquez  Date of admission:  8/25/2020 12:17 PM  MRN:   [id number]  YOB: 1975    CARDIAC CATHETERIZATION    Operators:  NPXAHRF:CHRIS Samaniego MD    Procedure performed:       [] Left Heart Catheterization. [] Graft Angiography.  [] Left Ventriculography. [] Right Heart Catheterization. [x] Coronary Angiography. [] Aortic Valve Studies. [] PCI:      [] Other:         Pre Procedure Conscious Sedation Data:    ASA Class:    [] I [x] II [] III [] IV    Mallampati Class:  [] I [x] II [] III [] IV      Indication:  [] STEMI      [x] + Stress test  [] ACS      [] + EKG Changes  [] Non Q MI       [] Significant Risk Factors  [] Recurrent Angina             [] Diabetes Mellitus    [] New LBBB      [] Uncontrolled HTN. [x] CHF / Low EF changes     [] Abnormal CTA / Ca Score  [] Other:     Procedure:  Access:  [x] Femoral artery  [x] Radial  artery       [x] Right  [] Left    Procedure: After informed consent was obtained with explanation of the risks and benefits, patient was brought to the cath lab. The right and left groin were prepped and draped in sterile fashion. 1% lidocaine was used for local block. Initially right radial artery was used for access but to spasm was switched to right femoral. The  right femoral artery was cannulated with 6  Fr sheath with brisk arterial blood return. The side port was frequently flushed and aspirated with normal saline. Findings:       LMCA: Normal 0% stenosis.     LAD: Normal 0% stenosis. The D1 is normal.     LCx: Normal 0% stenosis. The OM1 and OM2 are normal.     RCA: Normal 0% stenosis.      Coronary Tree      Dominance: Right      Estimated blood loss: 10 ml    Conclusions:   Normal coronary arteries. Recommendations:  1. Medical Therapy. 2. Risk Factors Modification.   3. Post Cath Protocol    History and Risk Factors    [x] Hypertension     [] Family history of CAD  [] Hyperlipidemia     [] Cerebrovascular Disease   [] Prior MI       [] Peripheral Vascular disease   [] Prior PCI              [] Diabetes Mellitus    [] Left Main PCI. [] Currently on Dialysis. [] Prior CABG. [] Currently smoker. [] Cardiac Arrest outside of healthcare facility. [] Yes    [x] No        Witnessed     [] Yes   [] No     Arrest after arrival of EMS  [] Yes   [] No     [] Cardiac Arrest at other Facility. [] Yes   [x] No    Pre-Procedure Information. Heart Failure       [x] Yes    [] No        Class  [] I      [x] II  [] III    [] IV. New Diagnosis    [x] Yes  [] No    HF Type      [x] Systolic   [] Diastolic          [] Unknown. Diagnostic Test:   EKG       [x] Normal   [] Abnormal    New antiarrhythmia medications:    [] Yes   [] No   New onset atrial fibrillation / Flutter     [] Yes   [] No   ECG Abnormalities:      [] V. Fib   [] Ghazal V. Tach           [] NS V. T   [] New LBBB           [] T. Inv  []  ST dev > 0.5 mm         [] PVC's freq  [] PVC's infrequent    Stress Test Performed:      [x] Yes    [] No     Type:     [] Stress Echo   [] Exercise Stress Test (no imaging)      [] Stress Nuclear  [x] Stress Imaging     Results   [] Negative   [x] Positive        [] Indeterminate  [] Unavailable     If Positive/ Risk / Extent of Ischemia:       [] Low  [x] Intermediate         [] High  [] Unavailable      Cardiac CTA Performed:     [] Yes    [x] No      Results   [] CAD   [] Non obstructive CAD      [] No CAD   [] Uncertain      [] Unknown   [] Structural Disease. Pre Procedure Medications:   [] Yes    [x] No         [] ASA   [] Beta Blockers      [] Nitrate   [] Ca Channel Blockers      [] Ranolazine   [] Statin       [] Plavix/Others antiplatelets      Electronically signed on 8/25/2020 at 4:27 PM by:     Charmayne Coe, MD  Fellow, [address]    I was present during entire procedure and performed all critical elements of the procedure.     Ni Lujan MD

## 2020-08-25 NOTE — PROGRESS NOTES
2-3 ml. Of air released from TR band every 15 minutes until all air is removed. No drainage or hematoma noted.

## 2020-08-25 NOTE — H&P
[facility]  Procedure History and Physical Update          Patient Name: Susan Corea  MRN:    [id number]  YOB: 1975  Date of evaluation:  2020    Procedure:    Cardiac cath +/- PCI    Indication for procedure:  Abnormal stress test      Please refer to the office note completed by Dr. Raheem Gross on 2020 in the medical record and note that:    [] I have examined the patient and reviewed the H&P/Consult and there are no changes to be made to the assessment or plan. [x] I have examined the patient and reviewed the H&P/Consult and have noted the following changes:  Positive stress test: 2020: basal and mid anterior wall reversible ischemia, global hypokinesis with anterior dyskinesis. EF 30%    Past Medical History:   Diagnosis Date    Anxiety     Chronic back pain     Depression     Hypertension     Neuropathy        Past Surgical History:   Procedure Laterality Date    HYSTERECTOMY, TOTAL ABDOMINAL      WISDOM TOOTH EXTRACTION         Family History   Problem Relation Age of Onset    Crohn's Disease Mother     Hypertension Mother     Heart Disease Father     Heart Surgery Father         CABG x 4 vessel    Hypertension Father     Heart Attack Sister         asystole, pacemaker, age 45 yrs   [facility] Hypertension Sister     Heart Failure Brother         age 36 yrs   [facility] Hypertension Brother     Heart Disease Maternal Grandmother          at age 58 yr    Heart Disease Paternal Grandmother          at age 61 yrs   [facility] Heart Disease Paternal Grandfather          at age 71 yrs       No Known Allergies    Prior to Admission medications    Medication Sig Start Date End Date Taking?  Authorizing Provider   ondansetron (ZOFRAN) 4 MG tablet Take 1 tablet by mouth every 8 hours as needed for Nausea 20  Yes Janes Kessler,    cyclobenzaprine (FLEXERIL) 10 MG tablet TAKE 1 TABLET BY MOUTH 3 TIMES A DAY AS NEEDED FOR MUSCLE SPASMS 8/10/20  Yes Ariane Villalta Codi Mcguire MD   gabapentin (NEURONTIN) 300 MG capsule TAKE 1 CAPSULE BY MOUTH 2 TIMES A DAY FOR 30 DAYS 8/13/20 9/12/20 Yes Hamzah Beltran MD   busPIRone (BUSPAR) 5 MG tablet TAKE 1 TABLET BY MOUTH 2 TIMES A DAY 8/10/20  Yes Hamzah Beltran MD   hydroCHLOROthiazide (HYDRODIURIL) 12.5 MG tablet Take 1 tablet by mouth every morning 8/6/20  Yes Carlos Stone MD   mirtazapine (REMERON) 15 MG tablet TAKE ONE TABLET BY MOUTH EVERY EVENING 8/6/20  Yes TAMARA Noel CNP   estrogens, conjugated, (PREMARIN) 0.625 MG tablet Take 1 tablet by mouth daily 8/4/20 11/2/20 Yes Mushtaq Cortes MD   clindamycin (CLEOCIN) 2 % vaginal cream Place vaginally nightly for 7 nights 8/4/20  Yes Mushtaq Cortes MD   buPROPion (WELLBUTRIN XL) 300 MG extended release tablet TAKE ONE TABLET BY MOUTH EVERY MORNING 5/26/20  Yes TAMARA Deal CNP   rOPINIRole (REQUIP) 1 MG tablet TAKE 1 TABLET BY MOUTH EVERY NIGHT AT BEDTIME 5/26/20  Yes TAMARA Deal CNP   pantoprazole (PROTONIX) 40 MG tablet Take 1 tablet by mouth daily 12/17/19  Yes TAMARA Deal CNP   fluticasone (FLONASE) 50 MCG/ACT nasal spray 1 spray by Nasal route daily 12/5/19  Yes Catina Bedolla MD   prazosin (MINIPRESS) 1 MG capsule Take 1 capsule by mouth nightly 7/13/20 8/12/20  TAMARA Deal CNP   atorvastatin (LIPITOR) 10 MG tablet Take 1 tablet by mouth daily 7/13/20 8/12/20  TAMARA Deal CNP   ibuprofen (ADVIL;MOTRIN) 800 MG tablet Take 1 tablet by mouth every 6 hours as needed for Pain 5/26/20 7/13/20  TAMARA Deal CNP   lisinopril (PRINIVIL;ZESTRIL) 10 MG tablet Take 1.5 tablets by mouth daily 12/17/19 7/13/20  TAMARA Deal - CNP         Vitals:    08/25/20 1319   BP: (!) 148/96   Pulse: 113   Resp: 18   Temp: 98.4 °F (36.9 °C)   SpO2: 95%       Constitutional and General Appearance:   alert, cooperative, no distress and appears stated age  HEENT:  · PERRL, EOMI  Respiratory:  · Normal excursion and expansion without use of accessory muscles  · Resp Auscultation:  Good respiratory effort. No for increased work of breathing. On auscultation: clear to auscultation bilaterally  Cardiovascular:  · Regular rate and rhythm. · S1/S2  · No murmurs. · The apical impulse is not displaced  Abdomen:  · Soft  · Bowel sounds present  · Non-tender to palpation  Extremities:  · No cyanosis or clubbing  · No Lower extremity edema  Skin:  · Warm and dry  Neurological:  · Alert and oriented. · Moves all extremities well    Impression:  1. Positive stress test: 08/17/2020: basal and mid anterior wall reversible ischemia, global hypokinesis with anterior dyskinesis. EF 30%  2. HTN  3. HLD  4. Obesity BMI 34.3    Plan:  · Proceed with planned procedure. · Further orders to follow. Risks, benefits, and alternatives of cardiac catheterization were discussed, in detail, with patient. Risks include, but not limited to, bleeding, requiring blood transfusion, vascular complication requiring surgery, renal failure with need of dialysis, CVA, MI, death and anesthesia complications including intubation were discussed. Patient verbalized understanding and agreed to proceed with the procedure understanding the above risks and alternatives to the procedure. Electronically signed on 08/25/20 at 3:13 PM by:     Dottie Olszewski, MD   Fellow, [address] [facility]

## 2020-08-25 NOTE — PROGRESS NOTES
TR band removed from right wrist. Pressure dressing with armboard applied. No hematoma or drainage noted.

## 2020-08-25 NOTE — PROGRESS NOTES
Patient admitted, consent signed and questions answered. Patient ready for procedure. Call light to reach with side rails up 2 of 2. BILATERAL GROINS clipped. FAMILY at bedside with patient. History and physical UPDATED.

## 2020-08-27 ENCOUNTER — HOSPITAL ENCOUNTER (EMERGENCY)
Age: 45
Discharge: HOME | End: 2020-08-27
Attending: EMERGENCY MEDICINE
Payer: COMMERCIAL

## 2020-08-27 ENCOUNTER — APPOINTMENT (OUTPATIENT)
Dept: CT IMAGING | Age: 45
End: 2020-08-27
Payer: COMMERCIAL

## 2020-08-27 ENCOUNTER — TELEPHONE (OUTPATIENT)
Dept: CARDIOLOGY | Age: 45
End: 2020-08-27

## 2020-08-27 ENCOUNTER — APPOINTMENT (OUTPATIENT)
Dept: INTERVENTIONAL RADIOLOGY/VASCULAR | Age: 45
End: 2020-08-27
Payer: COMMERCIAL

## 2020-08-27 VITALS
WEIGHT: 206 LBS | RESPIRATION RATE: 20 BRPM | OXYGEN SATURATION: 98 % | HEART RATE: 100 BPM | TEMPERATURE: 98.8 F | DIASTOLIC BLOOD PRESSURE: 89 MMHG | HEIGHT: 64 IN | SYSTOLIC BLOOD PRESSURE: 180 MMHG | BODY MASS INDEX: 35.17 KG/M2

## 2020-08-27 PROBLEM — R10.31 RIGHT GROIN PAIN: Status: ACTIVE | Noted: 2020-08-27

## 2020-08-27 PROBLEM — G89.18 POSTOPERATIVE PAIN: Status: ACTIVE | Noted: 2020-08-27

## 2020-08-27 LAB
-: NORMAL
ABSOLUTE EOS #: 0.12 K/UL (ref 0–0.44)
ABSOLUTE IMMATURE GRANULOCYTE: 0.04 K/UL (ref 0–0.3)
ABSOLUTE LYMPH #: 2.31 K/UL (ref 1.1–3.7)
ABSOLUTE MONO #: 0.66 K/UL (ref 0.1–1.2)
AMORPHOUS: NORMAL
ANION GAP SERPL CALCULATED.3IONS-SCNC: 11 MMOL/L (ref 9–17)
BACTERIA: NORMAL
BASOPHILS # BLD: 0 % (ref 0–2)
BASOPHILS ABSOLUTE: 0.03 K/UL (ref 0–0.2)
BILIRUBIN URINE: NEGATIVE
BUN BLDV-MCNC: 13 MG/DL (ref 6–20)
BUN/CREAT BLD: 22 (ref 9–20)
CALCIUM SERPL-MCNC: 8.9 MG/DL (ref 8.6–10.4)
CASTS UA: NORMAL /LPF (ref 0–2)
CHLORIDE BLD-SCNC: 104 MMOL/L (ref 98–107)
CO2: 24 MMOL/L (ref 20–31)
COLOR: ABNORMAL
COMMENT UA: ABNORMAL
CREAT SERPL-MCNC: 0.59 MG/DL (ref 0.5–0.9)
CRYSTALS, UA: NORMAL /HPF
DIFFERENTIAL TYPE: ABNORMAL
EOSINOPHILS RELATIVE PERCENT: 1 % (ref 1–4)
EPITHELIAL CELLS UA: NORMAL /HPF (ref 0–5)
GFR AFRICAN AMERICAN: >60 ML/MIN
GFR NON-AFRICAN AMERICAN: >60 ML/MIN
GFR SERPL CREATININE-BSD FRML MDRD: ABNORMAL ML/MIN/{1.73_M2}
GFR SERPL CREATININE-BSD FRML MDRD: ABNORMAL ML/MIN/{1.73_M2}
GLUCOSE BLD-MCNC: 136 MG/DL (ref 70–99)
GLUCOSE URINE: NEGATIVE
HCT VFR BLD CALC: 35.6 % (ref 36.3–47.1)
HEMOGLOBIN: 11.4 G/DL (ref 11.9–15.1)
IMMATURE GRANULOCYTES: 0 %
KETONES, URINE: NEGATIVE
LEUKOCYTE ESTERASE, URINE: NEGATIVE
LYMPHOCYTES # BLD: 23 % (ref 24–43)
MCH RBC QN AUTO: 29.3 PG (ref 25.2–33.5)
MCHC RBC AUTO-ENTMCNC: 32 G/DL (ref 25.2–33.5)
MCV RBC AUTO: 91.5 FL (ref 82.6–102.9)
MONOCYTES # BLD: 7 % (ref 3–12)
MUCUS: NORMAL
NITRITE, URINE: NEGATIVE
NRBC AUTOMATED: 0 PER 100 WBC
OTHER OBSERVATIONS UA: NORMAL
PDW BLD-RTO: 14 % (ref 11.8–14.4)
PH UA: 7 (ref 5–6)
PLATELET # BLD: 425 K/UL (ref 138–453)
PLATELET ESTIMATE: ABNORMAL
PMV BLD AUTO: 8.4 FL (ref 8.1–13.5)
POTASSIUM SERPL-SCNC: 3.9 MMOL/L (ref 3.7–5.3)
PROTEIN UA: NEGATIVE
RBC # BLD: 3.89 M/UL (ref 3.95–5.11)
RBC # BLD: ABNORMAL 10*6/UL
RBC UA: NORMAL /HPF (ref 0–4)
RENAL EPITHELIAL, UA: NORMAL /HPF
SEG NEUTROPHILS: 69 % (ref 36–65)
SEGMENTED NEUTROPHILS ABSOLUTE COUNT: 6.86 K/UL (ref 1.5–8.1)
SODIUM BLD-SCNC: 139 MMOL/L (ref 135–144)
SPECIFIC GRAVITY UA: 1 (ref 1.01–1.02)
TRICHOMONAS: NORMAL
TURBIDITY: ABNORMAL
URINE HGB: NEGATIVE
UROBILINOGEN, URINE: NORMAL
WBC # BLD: 10 K/UL (ref 3.5–11.3)
WBC # BLD: ABNORMAL 10*3/UL
WBC UA: NORMAL /HPF (ref 0–4)
YEAST: NORMAL

## 2020-08-27 PROCEDURE — 81001 URINALYSIS AUTO W/SCOPE: CPT

## 2020-08-27 PROCEDURE — 2709999900 CT ABDOMEN PELVIS W IV CONTRAST

## 2020-08-27 PROCEDURE — 80048 BASIC METABOLIC PNL TOTAL CA: CPT

## 2020-08-27 PROCEDURE — 85025 COMPLETE CBC W/AUTO DIFF WBC: CPT

## 2020-08-27 PROCEDURE — 99284 EMERGENCY DEPT VISIT MOD MDM: CPT

## 2020-08-27 PROCEDURE — 6360000002 HC RX W HCPCS: Performed by: EMERGENCY MEDICINE

## 2020-08-27 PROCEDURE — 2580000003 HC RX 258: Performed by: EMERGENCY MEDICINE

## 2020-08-27 PROCEDURE — 96374 THER/PROPH/DIAG INJ IV PUSH: CPT

## 2020-08-27 PROCEDURE — 36415 COLL VENOUS BLD VENIPUNCTURE: CPT

## 2020-08-27 PROCEDURE — 6360000004 HC RX CONTRAST MEDICATION: Performed by: EMERGENCY MEDICINE

## 2020-08-27 PROCEDURE — 93926 LOWER EXTREMITY STUDY: CPT

## 2020-08-27 RX ORDER — 0.9 % SODIUM CHLORIDE 0.9 %
1000 INTRAVENOUS SOLUTION INTRAVENOUS ONCE
Status: COMPLETED | OUTPATIENT
Start: 2020-08-27 | End: 2020-08-27

## 2020-08-27 RX ORDER — KETOROLAC TROMETHAMINE 30 MG/ML
15 INJECTION, SOLUTION INTRAMUSCULAR; INTRAVENOUS ONCE
Status: COMPLETED | OUTPATIENT
Start: 2020-08-27 | End: 2020-08-27

## 2020-08-27 RX ORDER — IBUPROFEN 600 MG/1
600 TABLET ORAL EVERY 6 HOURS PRN
Qty: 30 TABLET | Refills: 0 | Status: SHIPPED | OUTPATIENT
Start: 2020-08-27 | End: 2020-10-01 | Stop reason: DRUGHIGH

## 2020-08-27 RX ORDER — OXYCODONE HYDROCHLORIDE AND ACETAMINOPHEN 5; 325 MG/1; MG/1
1 TABLET ORAL EVERY 6 HOURS PRN
Qty: 10 TABLET | Refills: 0 | Status: SHIPPED | OUTPATIENT
Start: 2020-08-27 | End: 2020-08-30

## 2020-08-27 RX ADMIN — KETOROLAC TROMETHAMINE 15 MG: 30 INJECTION, SOLUTION INTRAMUSCULAR at 10:03

## 2020-08-27 RX ADMIN — SODIUM CHLORIDE 1000 ML: 9 INJECTION, SOLUTION INTRAVENOUS at 10:03

## 2020-08-27 RX ADMIN — IOPAMIDOL 100 ML: 755 INJECTION, SOLUTION INTRAVENOUS at 10:17

## 2020-08-27 ASSESSMENT — PAIN SCALES - GENERAL
PAINLEVEL_OUTOF10: 4
PAINLEVEL_OUTOF10: 6
PAINLEVEL_OUTOF10: 6
PAINLEVEL_OUTOF10: 4

## 2020-08-27 ASSESSMENT — PAIN DESCRIPTION - LOCATION: LOCATION: GROIN

## 2020-08-27 ASSESSMENT — PAIN DESCRIPTION - DESCRIPTORS: DESCRIPTORS: SHOOTING

## 2020-08-27 ASSESSMENT — PAIN DESCRIPTION - ORIENTATION: ORIENTATION: RIGHT

## 2020-08-27 ASSESSMENT — ENCOUNTER SYMPTOMS: ABDOMINAL DISTENTION: 1

## 2020-08-27 NOTE — TELEPHONE ENCOUNTER
Pt  called and said pt had a cardiac cath on 8/25/2020 and the pt doesn't feel right. She has swelling and is in a lot of pain.  wanted to have her seen same day saying she was poked a bunch of times and they are idiots that did something wrong to her. I explained I could put him on hold to discuss but he didn't want to hold and said he would take her to the ER. He did schedule a follow up appt for post cath and er.

## 2020-08-27 NOTE — ED NOTES
Spoke with Teo in radiology states US should be down to perform [address] soon patient informed     Gilberto Mitchell RN  08/27/20 [id number]

## 2020-08-27 NOTE — ED PROVIDER NOTES
[facility] ED  [address]  [place] [address] [place]  Phone: [phone]        Pt Name: Ara Meza  MRN: [id number]  Melissa 1975  Date of evaluation: 8/27/20      CHIEF COMPLAINT     Chief Complaint   Patient presents with    Post-op Problem     right groin pain cath 8/25         HISTORY OF PRESENT ILLNESS  (Location/Symptom, Timing/Onset, Context/Setting, Quality, Duration, Modifying Factors, Severity.)    Ara Meza is a 40 y.o. female who presents with right groin pain. The patient had a cardiac cath on the 25th of this month where they made multiple attempts to insert a catheter in her right groin but without success they ultimately had to do the cath in the right wrist the patient states that since then she has had right groin pain that radiates into her right thigh and she feels as though her abdomen is distended she denies any fever or chills no vomiting or diarrhea no blood in her urine or stool nothing she does makes her symptoms better or worse      REVIEW OF SYSTEMS    (2-9 systems for level 4, 10 or more for level 5)     Review of Systems   Gastrointestinal: Positive for abdominal distention. Musculoskeletal:        Right groin pain   All other systems reviewed and are negative. PAST MEDICAL HISTORY    has a past medical history of Anxiety, Chronic back pain, Depression, Hypertension, and Neuropathy. SURGICAL HISTORY      has a past surgical history that includes Hysterectomy, total abdominal and [place] tooth extraction.     CURRENTMEDICATIONS       Previous Medications    ATORVASTATIN (LIPITOR) 10 MG TABLET    Take 1 tablet by mouth daily    BUPROPION (WELLBUTRIN XL) 300 MG EXTENDED RELEASE TABLET    TAKE ONE TABLET BY MOUTH EVERY MORNING    BUSPIRONE (BUSPAR) 5 MG TABLET    TAKE 1 TABLET BY MOUTH 2 TIMES A DAY    CLINDAMYCIN (CLEOCIN) 2 % VAGINAL CREAM    Place vaginally nightly for 7 nights    CYCLOBENZAPRINE (FLEXERIL) 10 MG TABLET    TAKE 1 TABLET BY MOUTH 3 TIMES A DAY AS NEEDED FOR MUSCLE SPASMS    ESTROGENS, CONJUGATED, (PREMARIN) 0.625 MG TABLET    Take 1 tablet by mouth daily    FLUTICASONE (FLONASE) 50 MCG/ACT NASAL SPRAY    1 spray by Nasal route daily    GABAPENTIN (NEURONTIN) 300 MG CAPSULE    TAKE 1 CAPSULE BY MOUTH 2 TIMES A DAY FOR 30 DAYS    HYDROCHLOROTHIAZIDE (HYDRODIURIL) 12.5 MG TABLET    Take 1 tablet by mouth every morning    IBUPROFEN (ADVIL;MOTRIN) 800 MG TABLET    Take 1 tablet by mouth every 6 hours as needed for Pain    LISINOPRIL (PRINIVIL;ZESTRIL) 10 MG TABLET    Take 1.5 tablets by mouth daily    MIRTAZAPINE (REMERON) 15 MG TABLET    TAKE ONE TABLET BY MOUTH EVERY EVENING    ONDANSETRON (ZOFRAN) 4 MG TABLET    Take 1 tablet by mouth every 8 hours as needed for Nausea    PANTOPRAZOLE (PROTONIX) 40 MG TABLET    Take 1 tablet by mouth daily    PRAZOSIN (MINIPRESS) 1 MG CAPSULE    Take 1 capsule by mouth nightly    ROPINIROLE (REQUIP) 1 MG TABLET    TAKE 1 TABLET BY MOUTH EVERY NIGHT AT BEDTIME       ALLERGIES     has No Known Allergies. FAMILY HISTORY     She indicated that her mother is alive. She indicated that her father is alive. She indicated that her sister is alive. She indicated that her brother is alive. She indicated that her maternal grandmother is . She indicated that her paternal grandmother is . She indicated that her paternal grandfather is . family history includes Crohn's Disease in her mother; Heart Attack in her sister; Heart Disease in her father, maternal grandmother, paternal grandfather, and paternal grandmother; Heart Failure in her brother; Heart Surgery in her father; Hypertension in her brother, father, mother, and sister. SOCIAL HISTORY      reports that she has been smoking cigarettes. She has a 7.50 pack-year smoking history. She has never used smokeless tobacco. She reports current alcohol use. She reports that she does not use drugs.     PHYSICAL EXAM    (up to 7 for level 4, 8 or more for level 5)   INITIAL VITALS:  height is 5' 4\" (1.626 m) and weight is 206 lb (93.4 kg). Her tympanic temperature is 98.8 °F (37.1 °C). Her blood pressure is 180/89 (abnormal) and her pulse is 100. Her respiration is 20 and oxygen saturation is 98%. Physical Exam  Vitals signs and nursing note reviewed. Constitutional:       Comments: Mild to moderate distress secondary to right groin pain   HENT:      Head: Normocephalic and atraumatic. Eyes:      Conjunctiva/sclera: Conjunctivae normal.   Neck:      Musculoskeletal: Normal range of motion and neck supple. Cardiovascular:      Rate and Rhythm: Normal rate and regular rhythm. Pulses: Normal pulses. Heart sounds: Normal heart sounds. Pulmonary:      Effort: Pulmonary effort is normal. No respiratory distress. Breath sounds: Normal breath sounds. No stridor. No wheezing, rhonchi or rales. Abdominal:      Palpations: Abdomen is soft. Comments: Hypoactive bowel sounds no tenderness to palpation over any of the abdominal quadrants the patient is noted to have some tenderness in the right groin region no pulsatile masses no signs of infection   Musculoskeletal: Normal range of motion. General: No swelling or tenderness. Comments: Good pulses motor sensation of the right lower extremity   Skin:     General: Skin is warm and dry. Findings: No rash. Neurological:      General: No focal deficit present. Mental Status: She is alert.          DIFFERENTIAL DIAGNOSIS/ MDM:     IV will be established I will check labs a UA CT of the abdomen and pelvis    DIAGNOSTIC RESULTS       RADIOLOGY:  Non-plain film images such as CT, Ultrasound and MRI are read by the radiologist. Plain radiographic images are visualized and the radiologist interpretations are reviewed as follows:         Interpretation per the Radiologist below, if available at the time of this note:    Blossom (Final result) Result time 08/27/20 10:52:58   Final result by Gaetano Sanchez MD (08/27/20 10:52:58)                 Impression:     1. Interval development of right hydroureter and mild right hydronephrosis   without evidence for obstructing calculus or other obstructing lesion. .   Cause for the changes are not evident. 2. New extensive right groin subcutaneous fat infiltration extending from   superficial to deep aspects around the femoral vessels about at least 10 cm   in length but most pronounced around proximal part of femoral artery. Possibilities would include cellulitis or mild hematoma from right femoral   vascular access.  Clinical correlation advised. Narrative:     EXAMINATION:   CT OF THE ABDOMEN AND PELVIS WITH CONTRAST 8/27/2020 10:17 am     TECHNIQUE:   CT of the abdomen and pelvis was performed with the administration of   intravenous contrast. Multiplanar reformatted images are provided for review. Dose modulation, iterative reconstruction, and/or weight based adjustment of   the mA/kV was utilized to reduce the radiation dose to as low as reasonably   achievable. COMPARISON:   08/20/2020     HISTORY:   ORDERING SYSTEM PROVIDED HISTORY: Pain   TECHNOLOGIST PROVIDED HISTORY:   IV Only Contrast   Pain   Reason for Exam: c/o right side abd pain. Hx hysterectomy   Acuity: Acute   Type of Exam: Initial     FINDINGS:   Lower Chest: Thin bandlike posterior basal left lower lobe densities   representing scarring unchanged. Organs: Liver, spleen, pancreas, adrenal glands and gallbladder show no   significant abnormalities.  Unremarkable left kidney.  Mild right   hydronephrosis has developed since the last study. GI/Bowel: There is limited evaluation due to absence of oral contrast.     The stomach shows no focal lesions.  Small bowel loops normal in caliber   showing no focal abnormalities. Normal appendix. Evaluation of the colon shows no acute process. Pelvis: Hysterectomy.  Urinary bladder grossly normal.     Peritoneum/Retroperitoneum: Interval development of right hydroureter.  No   obstructing ureteric calculus evident.  Left ureter unremarkable.  No free   intraperitoneal fluid.  No retroperitoneal or mesenteric lymphadenopathy is   demonstrated. Bones/Soft Tissues: There has been interval placement of says she right groin   fat stranding which extends from just under the skin to around the femoral   vessels.  Cranial caudally this extends from about the level of inguinal ring   into the thigh and in the distal most extension is not visualized.  What is   visualized is most pronounced around the proximal right femoral artery. Findings suggest cellulitis.  Alternatively this could be due to hematoma   from vascular access in appropriate clinical setting.  Please correlate   clinically. No acute bony abnormality. VL DUP LOWER EXTREMITY ARTERIES RIGHT (Final result)   Result time 08/27/20 13:55:30   Procedure changed from [address]   Final result by Blaine Iglesias MD (08/27/20 13:55:30)                 Impression:     No evidence of right groin pseudoaneurysm. Narrative:     EXAMINATION:   ARTERIAL DUPLEX ULTRASOUND OF THE  LOWER EXTREMITY  8/27/2020 12:24 pm     TECHNIQUE:   Duplex ultrasound and Doppler images were obtained of the lower extremity. COMPARISON:   None.      HISTORY:   ORDERING SYSTEM PROVIDED HISTORY: US Right groin to rule out pseudoaneurysm   TECHNOLOGIST PROVIDED HISTORY:   US Right groin to rule out pseudoaneurysm     FINDINGS:   Grayscale and duplex imaging of the right groin was performed and shows no   regional fluid collection.  No pseudoaneurysm or significant hematoma is   seen.  Visualized common femoral artery and proximal SFA demonstrate color   flow with an arterial spectral Doppler waveform.  There are small   benign-appearing lymph nodes in the region               Urobilinogen, Urine Normal Normal    Nitrite, Urine NEGATIVE NEGATIVE    Leukocyte Esterase, Urine NEGATIVE NEGATIVE    Urinalysis Comments NOT REPORTED    Microscopic Urinalysis   Result Value Ref Range    -          WBC, UA None 0 - 4 /HPF    RBC, UA 0 TO 4 0 - 4 /HPF    Casts UA NOT REPORTED 0 - 2 /LPF    Crystals, UA NOT REPORTED None /HPF    Epithelial Cells UA 0 TO 4 0 - 5 /HPF    Renal Epithelial, UA NOT REPORTED 0 /HPF    Bacteria, UA None None    Mucus, UA NOT REPORTED None    Trichomonas, UA NOT REPORTED None    Amorphous, UA NOT REPORTED None    Other Observations UA NOT REPORTED NOT REQ. Yeast, UA NOT REPORTED None           EMERGENCY DEPARTMENT COURSE:   Vitals:    Vitals:    08/27/20 1000 08/27/20 1035 08/27/20 1135 08/27/20 1205   BP: (!) 163/92 (!) 153/83 (!) 176/84 (!) 180/89   Pulse:  101  100   Resp:       Temp:       TempSrc:       SpO2: 97% 98% 98% 98%   Weight:       Height:         -------------------------  BP: (!) 180/89, Temp: 98.8 °F (37.1 °C), Pulse: 100, Resp: 20(patient anxious encouraged to slow breathing down)      RE-EVALUATION:  The patient presents with right groin pain after an attempted cardiac cath in the right groin CT did show inflammation around the femoral sheath and discussing this with cardiology this was felt to be a hematoma as the patient has no signs of cellulitis she has no fever we will go ahead and empirically place the patient on Motrin and Percocet an OARRS report was reviewed and there are no signs of diversion I am recommending that the patient return to the ER for worsening of symptoms increasing pain any abdominal pain fever or other concerns otherwise to follow-up with cardiology calling tomorrow for an appointment  At this time the patient is without objective evidence of an acute process requiring hospitalization or inpatient management.  They have remained hemodynamically stable throughout their entire ED visit and are stable for discharge with words are mis-transcribed.)    Morejon MD, F.A.C.E.P.   Attending Emergency Medicine Physician       Meme Prado MD  08/27/20 9008

## 2020-08-27 NOTE — ED NOTES
Patient aware radiology reads are delayed due to technical issues.  States understanding     Driss Iverson RN  08/27/20 8168

## 2020-09-01 ENCOUNTER — TELEPHONE (OUTPATIENT)
Dept: CARDIOLOGY | Age: 45
End: 2020-09-01

## 2020-09-01 ENCOUNTER — HOSPITAL ENCOUNTER (OUTPATIENT)
Dept: INTERVENTIONAL RADIOLOGY/VASCULAR | Age: 45
Discharge: HOME | End: 2020-09-03
Payer: COMMERCIAL

## 2020-09-01 PROCEDURE — 93926 LOWER EXTREMITY STUDY: CPT

## 2020-09-01 NOTE — TELEPHONE ENCOUNTER
Pt called and now has a knot in her groin area where the cath was placed. She has an appointment 9/18 but she is in so much pain she wanted to know if she can come in today.  There is an opening at 3pm. Pt would like to speak to someone

## 2020-09-03 ENCOUNTER — VIRTUAL VISIT (OUTPATIENT)
Dept: FAMILY MEDICINE CLINIC | Age: 45
End: 2020-09-03
Payer: COMMERCIAL

## 2020-09-03 PROCEDURE — 4004F PT TOBACCO SCREEN RCVD TLK: CPT | Performed by: NURSE PRACTITIONER

## 2020-09-03 PROCEDURE — 99213 OFFICE O/P EST LOW 20 MIN: CPT | Performed by: NURSE PRACTITIONER

## 2020-09-03 PROCEDURE — G8427 DOCREV CUR MEDS BY ELIG CLIN: HCPCS | Performed by: NURSE PRACTITIONER

## 2020-09-03 PROCEDURE — G8417 CALC BMI ABV UP PARAM F/U: HCPCS | Performed by: NURSE PRACTITIONER

## 2020-09-03 RX ORDER — LISINOPRIL 10 MG/1
15 TABLET ORAL DAILY
Qty: 30 TABLET | Refills: 0 | Status: SHIPPED | OUTPATIENT
Start: 2020-09-03 | End: 2020-10-01

## 2020-09-03 RX ORDER — BUSPIRONE HYDROCHLORIDE 7.5 MG/1
7.5 TABLET ORAL 2 TIMES DAILY
Qty: 60 TABLET | Refills: 5 | Status: SHIPPED | OUTPATIENT
Start: 2020-09-03 | End: 2020-10-03

## 2020-09-03 RX ORDER — LISINOPRIL 10 MG/1
TABLET ORAL
Qty: 45 TABLET | Refills: 5 | OUTPATIENT
Start: 2020-09-03

## 2020-09-03 ASSESSMENT — PATIENT HEALTH QUESTIONNAIRE - PHQ9
1. LITTLE INTEREST OR PLEASURE IN DOING THINGS: 1
SUM OF ALL RESPONSES TO PHQ9 QUESTIONS 1 & 2: 2
SUM OF ALL RESPONSES TO PHQ QUESTIONS 1-9: 2
2. FEELING DOWN, DEPRESSED OR HOPELESS: 1
SUM OF ALL RESPONSES TO PHQ QUESTIONS 1-9: 2

## 2020-09-03 ASSESSMENT — ENCOUNTER SYMPTOMS
RESPIRATORY NEGATIVE: 1
GASTROINTESTINAL NEGATIVE: 1

## 2020-09-11 RX ORDER — BUSPIRONE HYDROCHLORIDE 5 MG/1
TABLET ORAL
Qty: 60 TABLET | Refills: 0 | OUTPATIENT
Start: 2020-09-11

## 2020-09-11 RX ORDER — GABAPENTIN 300 MG/1
CAPSULE ORAL
Qty: 60 CAPSULE | Refills: 0 | Status: SHIPPED | OUTPATIENT
Start: 2020-09-11 | End: 2020-10-07

## 2020-09-11 NOTE — TELEPHONE ENCOUNTER
Last Appt:  9/3/2020  Next Appt:   1/14/2021    Med contract signed on 7/13/2020  Last Fill: 8/11/2020 #60 for 30    Med verified in Pending sale to [facility]

## 2020-10-01 RX ORDER — BUPROPION HYDROCHLORIDE 300 MG/1
TABLET ORAL
Qty: 30 TABLET | Refills: 2 | Status: SHIPPED | OUTPATIENT
Start: 2020-10-01 | End: 2021-01-08 | Stop reason: SDUPTHER

## 2020-10-01 RX ORDER — LISINOPRIL 10 MG/1
TABLET ORAL
Qty: 45 TABLET | Refills: 2 | Status: SHIPPED | OUTPATIENT
Start: 2020-10-01 | End: 2021-01-06 | Stop reason: SDUPTHER

## 2020-10-01 RX ORDER — IBUPROFEN 800 MG/1
TABLET ORAL
Qty: 120 TABLET | Refills: 2 | Status: SHIPPED | OUTPATIENT
Start: 2020-10-01 | End: 2021-01-06 | Stop reason: SDUPTHER

## 2020-10-07 NOTE — TELEPHONE ENCOUNTER
Last Appt:  9/3/2020 (VV depression fu)  Next Appt:   1/14/2021 (physical)    Med contract signed on 7/13/2020  Last Fill: 9/11/20 # 61 for 30    Med verified in [facility]

## 2020-10-08 RX ORDER — GABAPENTIN 300 MG/1
CAPSULE ORAL
Qty: 60 CAPSULE | Refills: 0 | Status: SHIPPED | OUTPATIENT
Start: 2020-10-10 | End: 2020-11-02 | Stop reason: SDUPTHER

## 2020-10-08 RX ORDER — CYCLOBENZAPRINE HCL 10 MG
TABLET ORAL
Qty: 90 TABLET | Refills: 3 | Status: SHIPPED | OUTPATIENT
Start: 2020-10-08 | End: 2021-02-11 | Stop reason: SDUPTHER

## 2020-11-02 RX ORDER — GABAPENTIN 300 MG/1
CAPSULE ORAL
Qty: 60 CAPSULE | Refills: 2 | Status: SHIPPED | OUTPATIENT
Start: 2020-11-07 | End: 2021-02-11 | Stop reason: SDUPTHER

## 2020-11-02 NOTE — TELEPHONE ENCOUNTER
Last Appt:  9/3/2020  Next Appt:   1/14/2021 (Kenia Drake left to r/s)    Med contract signed on: 7/13/2020  No access to OARRS at this time     Med verified in [address] [place] [address] Rd

## 2020-11-19 ENCOUNTER — HOSPITAL ENCOUNTER (OUTPATIENT)
Dept: MAMMOGRAPHY | Age: 45
Discharge: HOME | End: 2020-11-21
Payer: COMMERCIAL

## 2020-11-19 PROCEDURE — 77063 BREAST TOMOSYNTHESIS BI: CPT

## 2020-11-23 ENCOUNTER — OFFICE VISIT (OUTPATIENT)
Dept: OBGYN | Age: 45
End: 2020-11-23
Payer: COMMERCIAL

## 2020-11-23 VITALS
DIASTOLIC BLOOD PRESSURE: 90 MMHG | SYSTOLIC BLOOD PRESSURE: 140 MMHG | WEIGHT: 206 LBS | HEIGHT: 64 IN | TEMPERATURE: 97.3 F | BODY MASS INDEX: 35.17 KG/M2 | HEART RATE: 86 BPM

## 2020-11-23 PROCEDURE — G8484 FLU IMMUNIZE NO ADMIN: HCPCS | Performed by: OBSTETRICS & GYNECOLOGY

## 2020-11-23 PROCEDURE — 99214 OFFICE O/P EST MOD 30 MIN: CPT

## 2020-11-23 PROCEDURE — 4004F PT TOBACCO SCREEN RCVD TLK: CPT | Performed by: OBSTETRICS & GYNECOLOGY

## 2020-11-23 PROCEDURE — 99214 OFFICE O/P EST MOD 30 MIN: CPT | Performed by: OBSTETRICS & GYNECOLOGY

## 2020-11-23 PROCEDURE — G8417 CALC BMI ABV UP PARAM F/U: HCPCS | Performed by: OBSTETRICS & GYNECOLOGY

## 2020-11-23 PROCEDURE — G8427 DOCREV CUR MEDS BY ELIG CLIN: HCPCS | Performed by: OBSTETRICS & GYNECOLOGY

## 2020-11-23 RX ORDER — BUSPIRONE HYDROCHLORIDE 7.5 MG/1
TABLET ORAL
COMMUNITY
Start: 2020-11-02 | End: 2021-01-06 | Stop reason: SDUPTHER

## 2020-11-23 RX ORDER — AMOXICILLIN AND CLAVULANATE POTASSIUM 875; 125 MG/1; MG/1
1 TABLET, FILM COATED ORAL 2 TIMES DAILY
Qty: 14 TABLET | Refills: 0 | Status: SHIPPED | OUTPATIENT
Start: 2020-11-23 | End: 2020-11-30

## 2020-11-23 RX ORDER — IBUPROFEN 200 MG
TABLET ORAL
Qty: 1 TUBE | Refills: 0 | Status: SHIPPED | OUTPATIENT
Start: 2020-11-23 | End: 2022-06-08 | Stop reason: ALTCHOICE

## 2020-11-23 NOTE — PROGRESS NOTES
Subjective:      Patient ID: Agapito Turner  is a 39 y.o. y.o. female. Patient had a history of hysterectomy in 3 months ago she had a full checkup with normal Pap smear normal mammogra  She stopped using the Premarin and wants to use Detrol herbs. C/o boil on breast recurrent   Gynecologic Exam   This is a recurrent problem. The current episode started more than 1 month ago. The problem occurs intermittently. The problem has been waxing and waning. Nothing aggravates the symptoms. She has tried nothing for the symptoms. The treatment provided no relief. Chief Complaint   Patient presents with    Cyst     under breasts, recurrent, antibiotics not working     Family History   Problem Relation Age of Onset    Crohn's Disease Mother     Hypertension Mother     Heart Disease Father     Heart Surgery Father         CABG x 4 vessel    Hypertension Father     Heart Attack Sister         asystole, pacemaker, age 45 yrs   [facility] Hypertension Sister     Heart Failure Brother         age 36 yrs   [facility] Hypertension Brother     Heart Disease Maternal Grandmother          at age 58 yr    Heart Disease Paternal Grandmother          at age 61 yrs   [facility] Heart Disease Paternal Grandfather          at age 71 yrs     Past Medical History:   Diagnosis Date    Anxiety     Chronic back pain     Depression     Hypertension     Neuropathy      Past Surgical History:   Procedure Laterality Date    HYSTERECTOMY, TOTAL ABDOMINAL      WISDOM TOOTH EXTRACTION        reports that she has been smoking cigarettes. She has a 7.50 pack-year smoking history. She has never used smokeless tobacco. She reports current alcohol use. She reports that she does not use drugs. No Known Allergies    Current Outpatient Medications:     neomycin-bacitracin-polymyxin (NEOSPORIN) 5-400-5000 ointment, Apply topically 4 times daily. , Disp: 1 Tube, Rfl: 0    amoxicillin-clavulanate (AUGMENTIN) 875-125 MG per tablet, Take 1 tablet by mouth 2 times daily for 7 days, Disp: 14 tablet, Rfl: 0    gabapentin (NEURONTIN) 300 MG capsule, TAKE ONE CAPSULE BY MOUTH 2 TIMES DAILY FOR 30 DAYS, Disp: 60 capsule, Rfl: 2    cyclobenzaprine (FLEXERIL) 10 MG tablet, TAKE 1 TABLET BY MOUTH THREE TIMES A DAY AS NEEDED FOR MUSCLE SPASMS, Disp: 90 tablet, Rfl: 3    lisinopril (PRINIVIL;ZESTRIL) 10 MG tablet, TAKE 1&1/2 TABLETS BY MOUTH ONE TIME DAILY, Disp: 45 tablet, Rfl: 2    buPROPion (WELLBUTRIN XL) 300 MG extended release tablet, TAKE ONE TABLET BY MOUTH EVERY MORNING, Disp: 30 tablet, Rfl: 2    ibuprofen (ADVIL;MOTRIN) 800 MG tablet, TAKE ONE TABLET BY MOUTH EVERY 6 HOURS AS NEEDED FOR PAIN, Disp: 120 tablet, Rfl: 2    ondansetron (ZOFRAN) 4 MG tablet, Take 1 tablet by mouth every 8 hours as needed for Nausea, Disp: 20 tablet, Rfl: 0    hydroCHLOROthiazide (HYDRODIURIL) 12.5 MG tablet, Take 1 tablet by mouth every morning, Disp: 30 tablet, Rfl: 5    mirtazapine (REMERON) 15 MG tablet, TAKE ONE TABLET BY MOUTH EVERY EVENING, Disp: 30 tablet, Rfl: 3    rOPINIRole (REQUIP) 1 MG tablet, TAKE 1 TABLET BY MOUTH EVERY NIGHT AT BEDTIME, Disp: 30 tablet, Rfl: 0    pantoprazole (PROTONIX) 40 MG tablet, Take 1 tablet by mouth daily, Disp: 30 tablet, Rfl: 11    fluticasone (FLONASE) 50 MCG/ACT nasal spray, 1 spray by Nasal route daily, Disp: 1 Bottle, Rfl: 0    busPIRone (BUSPAR) 7.5 MG tablet, , Disp: , Rfl:     estrogens, conjugated, (PREMARIN) 0.625 MG tablet, Take 1 tablet by mouth daily, Disp: 90 tablet, Rfl: 3    clindamycin (CLEOCIN) 2 % vaginal cream, Place vaginally nightly for 7 nights (Patient not taking: Reported on 11/23/2020), Disp: 1 Tube, Rfl: 0    prazosin (MINIPRESS) 1 MG capsule, Take 1 capsule by mouth nightly, Disp: 30 capsule, Rfl: 5    atorvastatin (LIPITOR) 10 MG tablet, Take 1 tablet by mouth daily, Disp: 30 tablet, Rfl: 3      Review of Systems   All other systems reviewed and are negative.     Breast ROS: negative    Objective:

## 2020-12-05 RX ORDER — MIRTAZAPINE 15 MG/1
TABLET, FILM COATED ORAL
Qty: 30 TABLET | Refills: 1 | Status: SHIPPED | OUTPATIENT
Start: 2020-12-05 | End: 2021-02-11

## 2020-12-07 DIAGNOSIS — F51.01 PRIMARY INSOMNIA: ICD-10-CM

## 2020-12-07 DIAGNOSIS — F32.A DEPRESSION, UNSPECIFIED DEPRESSION TYPE: ICD-10-CM

## 2020-12-07 RX ORDER — MIRTAZAPINE 15 MG/1
TABLET, FILM COATED ORAL
Qty: 30 TABLET | Refills: 1 | Status: CANCELLED | OUTPATIENT
Start: 2020-12-07

## 2020-12-07 RX ORDER — MIRTAZAPINE 15 MG/1
TABLET, FILM COATED ORAL
Qty: 30 TABLET | Refills: 1 | OUTPATIENT
Start: 2020-12-07

## 2021-01-05 ENCOUNTER — TELEPHONE (OUTPATIENT)
Dept: SLEEP CENTER | Age: 46
End: 2021-01-05

## 2021-01-06 DIAGNOSIS — G89.29 CHRONIC MIDLINE LOW BACK PAIN WITH BILATERAL SCIATICA: ICD-10-CM

## 2021-01-06 DIAGNOSIS — M54.42 CHRONIC MIDLINE LOW BACK PAIN WITH BILATERAL SCIATICA: ICD-10-CM

## 2021-01-06 DIAGNOSIS — G25.81 RESTLESS LEG SYNDROME: ICD-10-CM

## 2021-01-06 DIAGNOSIS — M54.41 CHRONIC MIDLINE LOW BACK PAIN WITH BILATERAL SCIATICA: ICD-10-CM

## 2021-01-06 DIAGNOSIS — I10 HYPERTENSION, UNSPECIFIED TYPE: ICD-10-CM

## 2021-01-06 DIAGNOSIS — E78.2 MIXED HYPERLIPIDEMIA: ICD-10-CM

## 2021-01-06 DIAGNOSIS — F32.A DEPRESSION, UNSPECIFIED DEPRESSION TYPE: Primary | ICD-10-CM

## 2021-01-07 RX ORDER — LISINOPRIL 10 MG/1
TABLET ORAL
Qty: 45 TABLET | Refills: 0 | Status: SHIPPED | OUTPATIENT
Start: 2021-01-07 | End: 2021-02-11

## 2021-01-07 RX ORDER — BUSPIRONE HYDROCHLORIDE 7.5 MG/1
7.5 TABLET ORAL 2 TIMES DAILY
Qty: 60 TABLET | Refills: 0 | Status: SHIPPED | OUTPATIENT
Start: 2021-01-07 | End: 2021-03-29 | Stop reason: SDUPTHER

## 2021-01-07 RX ORDER — ATORVASTATIN CALCIUM 10 MG/1
10 TABLET, FILM COATED ORAL DAILY
Qty: 30 TABLET | Refills: 0 | Status: CANCELLED | OUTPATIENT
Start: 2021-01-07 | End: 2021-02-06

## 2021-01-07 RX ORDER — IBUPROFEN 800 MG/1
TABLET ORAL
Qty: 90 TABLET | Refills: 0 | Status: SHIPPED | OUTPATIENT
Start: 2021-01-07 | End: 2021-02-11

## 2021-01-07 NOTE — TELEPHONE ENCOUNTER
Pt should have run out of Lipitor in November per med list - is she taking daily? Also, pt is due for updated labs prior to her upcoming appt on 1/22 - please remind her to do (fasting) so we can review results at appt.

## 2021-01-08 RX ORDER — BUPROPION HYDROCHLORIDE 300 MG/1
TABLET ORAL
Qty: 30 TABLET | Refills: 0 | Status: SHIPPED | OUTPATIENT
Start: 2021-01-08 | End: 2021-02-11

## 2021-01-08 NOTE — TELEPHONE ENCOUNTER
Alan Dinh says she still has Lipitor left. Fax rec'd from [facility] for Bupropion. Alan Dinh says yes she does need that.

## 2021-01-18 ENCOUNTER — OFFICE VISIT (OUTPATIENT)
Dept: OPTOMETRY | Age: 46
End: 2021-01-18
Payer: COMMERCIAL

## 2021-01-18 DIAGNOSIS — H53.8 BLURRED VISION, BILATERAL: ICD-10-CM

## 2021-01-18 DIAGNOSIS — H52.203 ASTIGMATISM OF BOTH EYES WITH PRESBYOPIA: Primary | ICD-10-CM

## 2021-01-18 DIAGNOSIS — H52.4 ASTIGMATISM OF BOTH EYES WITH PRESBYOPIA: Primary | ICD-10-CM

## 2021-01-18 PROCEDURE — G8417 CALC BMI ABV UP PARAM F/U: HCPCS | Performed by: OPTOMETRIST

## 2021-01-18 PROCEDURE — 92004 COMPRE OPH EXAM NEW PT 1/>: CPT | Performed by: OPTOMETRIST

## 2021-01-18 PROCEDURE — 4004F PT TOBACCO SCREEN RCVD TLK: CPT | Performed by: OPTOMETRIST

## 2021-01-18 PROCEDURE — G8427 DOCREV CUR MEDS BY ELIG CLIN: HCPCS | Performed by: OPTOMETRIST

## 2021-01-18 ASSESSMENT — REFRACTION_MANIFEST
OS_ADD: +1.50
OD_SPHERE: +0.25
OD_ADD: +1.50
OS_AXIS: 165
OS_SPHERE: -0.25
OS_CYLINDER: -1.00
OD_AXIS: 033
OD_CYLINDER: -0.75

## 2021-01-18 ASSESSMENT — KERATOMETRY
OD_AXISANGLE2_DEGREES: 018
OD_AXISANGLE_DEGREES: 108
OS_K1POWER_DIOPTERS: 44.00
OS_AXISANGLE2_DEGREES: 168
OD_K1POWER_DIOPTERS: 44.50
METHOD_AUTO_MANUAL: AUTOMATED
OS_K2POWER_DIOPTERS: 44.50
OD_K2POWER_DIOPTERS: 44.75
OS_AXISANGLE_DEGREES: 078

## 2021-01-18 ASSESSMENT — TONOMETRY
IOP_METHOD: NON-CONTACT AIR PUFF
OD_IOP_MMHG: 25
OS_IOP_MMHG: 25

## 2021-01-18 ASSESSMENT — VISUAL ACUITY
OD_SC: 20/40 OU
METHOD: SNELLEN - LINEAR
OD_SC+: -2
OD_SC: 20/25
OS_SC: 20/30

## 2021-01-18 ASSESSMENT — SLIT LAMP EXAM - LIDS
COMMENTS: NORMAL
COMMENTS: NORMAL

## 2021-01-18 NOTE — PROGRESS NOTES
 gabapentin (NEURONTIN) 300 MG capsule TAKE ONE CAPSULE BY MOUTH 2 TIMES DAILY FOR 30 DAYS 60 capsule 2    estrogens, conjugated, (PREMARIN) 0.625 MG tablet Take 1 tablet by mouth daily 90 tablet 3    clindamycin (CLEOCIN) 2 % vaginal cream Place vaginally nightly for 7 nights (Patient not taking: Reported on 2020) 1 Tube 0    prazosin (MINIPRESS) 1 MG capsule Take 1 capsule by mouth nightly 30 capsule 5    atorvastatin (LIPITOR) 10 MG tablet Take 1 tablet by mouth daily 30 tablet 3     No current facility-administered medications for this visit.           Family History   Problem Relation Age of Onset    Crohn's Disease Mother     Hypertension Mother     Heart Disease Father     Heart Surgery Father         CABG x 4 vessel    Hypertension Father     Heart Attack Sister         asystole, pacemaker, age 45 yrs   [facility] Hypertension Sister     Heart Failure Brother         age 36 yrs   [facility] E [facility] Hypertension Brother     Heart Disease Maternal Grandmother          at age 58 yr   [facility] E [facility] Heart Disease Paternal Grandmother          at age 61 yrs   Maria E [facility] Heart Disease Paternal Grandfather          at age 71 yrs   Maria E Shore Diabetes Neg Hx     Cataracts Neg Hx     Glaucoma Neg Hx      Social History     Socioeconomic History    Marital status:      Spouse name: Triny Ng    Number of children: 11    Years of education: 15    Highest education level: High school graduate   Occupational History    None   Social Needs    Financial resource strain: Somewhat hard    Food insecurity     Worry: Never true     Inability: Never true    Transportation needs     Medical: No     Non-medical: No   Tobacco Use    Smoking status: Current Every Day Smoker     Packs/day: 0.25     Years: 30.00     Pack years: 7.50     Types: Cigarettes    Smokeless tobacco: Never Used    Tobacco comment: cut down to 4 cigarettes a day   Substance and Sexual Activity    Alcohol use: Yes     Frequency: Monthly or less Drinks per session: 1 or 2     Binge frequency: Never     Comment: socially    Drug use: Never    Sexual activity: Yes     Partners: Male     Comment: with  of 14 years   Lifestyle    Physical activity     Days per week: None     Minutes per session: None    Stress: None   Relationships    Social connections     Talks on phone: None     Gets together: None     Attends Baptism service: None     Active member of club or organization: None     Attends meetings of clubs or organizations: None     Relationship status: None    Intimate partner violence     Fear of current or ex partner: None     Emotionally abused: None     Physically abused: None     Forced sexual activity: None   Other Topics Concern    None   Social History Narrative    None     Past Medical History:   Diagnosis Date    Anxiety     Chronic back pain     Depression     Hypertension     Neuropathy            Main Ophthalmology Exam     External Exam       Right Left    External Normal Normal          Slit Lamp Exam       Right Left    Lids/Lashes Normal Normal    Conjunctiva/Sclera White and quiet White and quiet    Cornea Clear Clear    Anterior Chamber Deep and quiet Deep and quiet    Iris Round and reactive Round and reactive    Lens Clear Clear    Vitreous Normal Normal          Fundus Exam       Right Left    Disc Normal Normal    C/D Ratio 0.2 0.2    Macula Normal Normal    Vessels Normal Normal                   Tonometry     Tonometry (Non-contact air puff, 2:18 PM)       Right Left    Pressure 25 25   IOP.5             24.9  CH:  9.0          9.9  WS: 5.5          7.0                  Not recorded         Not recorded          Visual Acuity (Snellen - Linear)       Right Left    Dist sc 20/25 -2 20/30    Near sc 20/40 OU           Pupils     Pupils       Pupils    Right PERRL    Left PERRL              Neuro/Psych     Neuro/Psych     Oriented x3: Yes    Mood/Affect: Normal              Keratometry Keratometry (Automated)       K1 Axis K2 Axis    Right 44.50 018 44.75 108    Left 44.00 168 44.50 078                  Ophthalmology Exam     Wearing Rx       Sphere    Right broken ;  not with     Left               Manifest Refraction     Manifest Refraction       Sphere Cylinder [place] Dist [place] Add Near [place]    Right +0.25 -0.75 033 20/25 +1.50     Left -0.25 -1.00 165 20/25 +1.50 20/20ou          Manifest Refraction #2 (Auto)       Sphere Cylinder [place] Dist VA Add Near [place]    Right +0.25 -0.50 035       Left -0.25 -0.75 165                  Final Rx       Sphere Cylinder Axis Add    Right +0.25 -0.75 033 +1.50    Left -0.25 -1.00 165 +1.50    Type: Bifocal    Expiration Date: 1/19/2023            1. Astigmatism of both eyes with presbyopia    2.  Blurred vision, bilateral           Patient Instructions   New glasses recommended      Return in about 2 years (around 1/18/2023) for complete eye exam.

## 2021-01-20 DIAGNOSIS — E78.2 MIXED HYPERLIPIDEMIA: ICD-10-CM

## 2021-01-20 RX ORDER — ATORVASTATIN CALCIUM 10 MG/1
TABLET, FILM COATED ORAL
Qty: 30 TABLET | Refills: 3 | OUTPATIENT
Start: 2021-01-20

## 2021-01-25 DIAGNOSIS — E78.2 MIXED HYPERLIPIDEMIA: ICD-10-CM

## 2021-01-26 RX ORDER — ATORVASTATIN CALCIUM 10 MG/1
10 TABLET, FILM COATED ORAL DAILY
Qty: 30 TABLET | Refills: 1 | Status: SHIPPED | OUTPATIENT
Start: 2021-01-26 | End: 2022-08-15 | Stop reason: SDUPTHER

## 2021-02-10 RX ORDER — HYDROCHLOROTHIAZIDE 12.5 MG/1
TABLET ORAL
Qty: 30 TABLET | Refills: 5 | OUTPATIENT
Start: 2021-02-10

## 2021-02-10 NOTE — TELEPHONE ENCOUNTER
Last Appt:  9/15/2020  Next Appt:   2/11/2021  Med verified in [facility]

## 2021-02-11 ENCOUNTER — OFFICE VISIT (OUTPATIENT)
Dept: CARDIOLOGY | Age: 46
End: 2021-02-11
Payer: COMMERCIAL

## 2021-02-11 VITALS
WEIGHT: 199 LBS | HEIGHT: 64 IN | SYSTOLIC BLOOD PRESSURE: 144 MMHG | HEART RATE: 103 BPM | BODY MASS INDEX: 33.97 KG/M2 | DIASTOLIC BLOOD PRESSURE: 89 MMHG

## 2021-02-11 DIAGNOSIS — I10 ESSENTIAL HYPERTENSION: Primary | ICD-10-CM

## 2021-02-11 PROCEDURE — 99214 OFFICE O/P EST MOD 30 MIN: CPT

## 2021-02-11 PROCEDURE — 93005 ELECTROCARDIOGRAM TRACING: CPT | Performed by: INTERNAL MEDICINE

## 2021-02-11 PROCEDURE — 99214 OFFICE O/P EST MOD 30 MIN: CPT | Performed by: INTERNAL MEDICINE

## 2021-02-11 PROCEDURE — 93010 ELECTROCARDIOGRAM REPORT: CPT | Performed by: INTERNAL MEDICINE

## 2021-02-11 PROCEDURE — G8484 FLU IMMUNIZE NO ADMIN: HCPCS | Performed by: INTERNAL MEDICINE

## 2021-02-11 PROCEDURE — G8427 DOCREV CUR MEDS BY ELIG CLIN: HCPCS | Performed by: INTERNAL MEDICINE

## 2021-02-11 PROCEDURE — 4004F PT TOBACCO SCREEN RCVD TLK: CPT | Performed by: INTERNAL MEDICINE

## 2021-02-11 PROCEDURE — G8417 CALC BMI ABV UP PARAM F/U: HCPCS | Performed by: INTERNAL MEDICINE

## 2021-02-11 RX ORDER — CYCLOBENZAPRINE HCL 10 MG
TABLET ORAL
Qty: 30 TABLET | Refills: 0 | Status: SHIPPED | OUTPATIENT
Start: 2021-02-11 | End: 2021-03-29 | Stop reason: SDUPTHER

## 2021-02-11 RX ORDER — HYDROCHLOROTHIAZIDE 25 MG/1
25 TABLET ORAL EVERY MORNING
Qty: 30 TABLET | Refills: 3 | Status: SHIPPED | OUTPATIENT
Start: 2021-02-11 | End: 2022-08-15 | Stop reason: SDUPTHER

## 2021-02-11 NOTE — PROGRESS NOTES
Today's Date: 2/11/2021  Patient Name: Silvana De La Torre  Patient's age: 39 y. o., 1975          CC: the patient is a 39 y.o.  female is in the office for HTN. She is stable from cardiac standpoint, denies any angina, occasionally have chest tightness or pressure which is mainly related to stress and elevated blood pressure. No dyspnea on exertion  Orthopnea, lower extremity edema or palpitations  Her blood pressure is uncontrolled and she reports that it is running 646 systolic at home       Past Medical History:   has a past medical history of Anxiety, Chronic back pain, Depression, Hypertension, and Neuropathy. Past Surgical History:   has a past surgical history that includes Hysterectomy, total abdominal and [place] tooth extraction. Home Medications:    Prior to Admission medications    Medication Sig Start Date End Date Taking?  Authorizing Provider   buPROPion (WELLBUTRIN XL) 300 MG extended release tablet TAKE ONE TABLET BY MOUTH EVERY MORNING 2/11/21  Yes LYNDON Jewell   ibuprofen (ADVIL;MOTRIN) 800 MG tablet TAKE ONE TABLET BY MOUTH EVERY 8 HOURS AS NEEDED FOR PAIN 2/11/21  Yes LYNDON Jewell   mirtazapine (REMERON) 15 MG tablet TAKE 1 TABLET BY MOUTH EVERY EVENING 2/11/21  Yes LYNDON Jewell   lisinopril (PRINIVIL;ZESTRIL) 10 MG tablet TAKE 1 AND 1/2 TABLET BY MOUTH ONE TIME DAILY  Patient taking differently: 10 mg TAKE 1 AND 1/2 TABLET BY MOUTH ONE TIME DAILY 2/11/21  Yes LYNDON Jewell   gabapentin (NEURONTIN) 300 MG capsule TAKE ONE CAPSULE BY MOUTH 2 TIMES DAILY FOR 30 DAYS 2/11/21 3/12/21 Yes LYNDON Jewell   cyclobenzaprine (FLEXERIL) 10 MG tablet TAKE 1 TABLET BY MOUTH THREE TIMES A DAY AS NEEDED FOR MUSCLE SPASMS 2/11/21  Yes LYNDON Jewell   atorvastatin (LIPITOR) 10 MG tablet Take 1 tablet by mouth daily 1/26/21  Yes Linda Newman,    busPIRone (BUSPAR) 7.5 MG tablet Take 1 tablet by mouth 2 times daily 21  Yes Amy Newman,    neomycin-bacitracin-polymyxin (NEOSPORIN) 5-400-5000 ointment Apply topically 4 times daily. 20  Yes Mushtaq Cortes MD   hydroCHLOROthiazide (HYDRODIURIL) 12.5 MG tablet Take 1 tablet by mouth every morning 20  Yes Filemon Davidson MD   estrogens, conjugated, (PREMARIN) 0.625 MG tablet Take 1 tablet by mouth daily 20 Yes Mushtaq Cortes MD   clindamycin (CLEOCIN) 2 % vaginal cream Place vaginally nightly for 7 nights 20  Yes Mushtaq Cortes MD   prazosin (MINIPRESS) 1 MG capsule Take 1 capsule by mouth nightly 20 Yes TAMARA Deal CNP   rOPINIRole (REQUIP) 1 MG tablet TAKE 1 TABLET BY MOUTH EVERY NIGHT AT BEDTIME 20  Yes TAMARA Deal CNP   pantoprazole (PROTONIX) 40 MG tablet Take 1 tablet by mouth daily 19  Yes TAMARA Deal CNP   fluticasone (FLONASE) 50 MCG/ACT nasal spray 1 spray by Nasal route daily 19  Yes Analy Ellington MD       Allergies:  Patient has no known allergies. Social History:   reports that she has been smoking cigarettes. She has a 15.00 pack-year smoking history. She has never used smokeless tobacco. She reports current alcohol use. She reports that she does not use drugs.      Family History:    Family History   Problem Relation Age of Onset    Crohn's Disease Mother     Hypertension Mother     Heart Disease Father     Heart Surgery Father         CABG x 4 vessel    Hypertension Father     Heart Attack Sister         asystole, pacemaker, age 45 yrs   [facility] Hypertension Sister     Heart Failure Brother         age 36 yrs   [facility] Hypertension Brother     Heart Disease Maternal Grandmother          at age 58 yr    Heart Disease Paternal Grandmother          at age 61 yrs   [facility] Heart Disease Paternal Grandfather          at age 71 yrs    Diabetes Neg Hx     Cataracts Neg Hx     Glaucoma Neg Hx        REVIEW OF SYSTEMS:  CONSTITUTIONAL:NEGATIVE  HEENT:NEG  Cardiovascular: Yes chest pain, Yes dyspnea on exertion, Yes palpitations. Lower extremity edema: No  RESPIRATORY: Negative for LUQUE  GASTROINTESTINAL:  negative  GENITOURINARY:  negative  INTEGUMENT:  negative  MUSCULOSKELETAL:  positive for  pain  NEUROLOGICAL:  negative    PHYSICAL EXAM:      BP (!) 177/107 (Site: Right Upper Arm, Position: Sitting, Cuff Size: Medium Adult)   Pulse 103   Ht 5' 4\" (1.626 m)   Wt 199 lb (90.3 kg)   BMI 34.16 kg/m²    HEENT: PERRL, no cervical lymphadenopathy. No masses palpable. Cardiovascular:  · The apical impulse is not displaced  · Heart  Sounds:RRR, S4  · Jugular venous pulsation Normal  · The carotid upstroke is normal  · Peripheral pulses are symmetrical and full  Respiratory: Good respiratory effort. On auscultation: clear to auscultation bilaterally  Abdomen:  · No masses or tenderness  · Bowel sounds present  Extremities:  ·  No Cyanosis or Clubbing  ·  Lower extremity edema: No  Skin: Warm and dry    Cardiac data:    EKG 2/11/2020: NSR, LVH       TTE 8/17/2020  Normal left ventricular diameter. Mild left ventricular hypertrophy. Left ventricular systolic function is mildly reduced, globally. Left  ventricular ejection fraction 40-45 %. Grade I (mild) left ventricular diastolic dysfunction. Left atrium is mildly dilated. Normal structure and function of valves. No pericardial effusion. Nuclears stress test 8/17/2020  1. High risk study. 2. Basal and mid anterior wall reversible ischemia. 3. Global hypokinesis with anterior dyskinesis. 4. Ejection fraction 30%. Cardiac cath 8/25/2020  Normal coronaries    Severe radial artery spasm noted     Labs:     CBC: No results for input(s): WBC, HGB, HCT, PLT in the last 72 hours. BMP: No results for input(s): NA, K, CO2, BUN, CREATININE, LABGLOM, GLUCOSE in the last 72 hours. PT/INR: No results for input(s): PROTIME, INR in the last 72 hours.   FASTING LIPID PANEL:  Lab Results   Component Value Date    HDL 55 07/07/2020    TRIG 284 07/07/2020     LIVER PROFILE:No results for input(s): AST, ALT, LABALBU in the last 72 hours.     Assessment:    · HTN, suboptimally controlled   · Atypical chest pain, normal coronaries on [facility] 8/25/2020  · Anxiety   · Palpitations  · HPL, on statin therapy      Patient Active Problem List   Diagnosis    Hypertension    Depression    Primary insomnia    Restless leg syndrome    Neuropathic pain of both legs    Morbidly obese ([address]    Right groin pain    Postoperative pain       Recommendations:  · Increase hydrochlorothiazide to 25 mg daily  · Continue lisinopril and Minipress  · If blood pressure still uncontrolled, will recommend starting Coreg  · Patient to continue monitor ambulatory blood pressure  · Anxiety is contributing to hypertension as well, follow-up with PCP regarding recommendations  · Counseled in detail regarding importance of smoking cessation, pharmacological and nonpharmacological strategies were discussed with her        Routine follow-up in 6 months or earlier if needed            Karen Montes [address] Card[facility] Consult           [phone]

## 2021-02-11 NOTE — TELEPHONE ENCOUNTER
Last Appt:  9/3/2020  Next Appt:   3/23/2021 with Dr Lila Vaughn verified in [address] [place] [address] Rd

## 2021-02-26 ENCOUNTER — TELEPHONE (OUTPATIENT)
Dept: FAMILY MEDICINE CLINIC | Age: 46
End: 2021-02-26

## 2021-03-10 ENCOUNTER — TELEPHONE (OUTPATIENT)
Dept: FAMILY MEDICINE CLINIC | Age: 46
End: 2021-03-10

## 2021-03-10 NOTE — TELEPHONE ENCOUNTER
Patient is calling and wanting a RX for a blood pressure cuff sent to our pharmacy to check her blood pressure.  Please call and advise

## 2021-03-11 DIAGNOSIS — I10 ESSENTIAL HYPERTENSION: Primary | ICD-10-CM

## 2021-03-11 RX ORDER — BLOOD PRESSURE TEST KIT
1 KIT MISCELLANEOUS DAILY
Qty: 1 KIT | Refills: 0 | Status: SHIPPED | OUTPATIENT
Start: 2021-03-11 | End: 2021-03-17 | Stop reason: SDUPTHER

## 2021-03-11 NOTE — TELEPHONE ENCOUNTER
[facility] for [facility] Blood Pressure Monitor

## 2021-03-12 ENCOUNTER — TELEPHONE (OUTPATIENT)
Dept: FAMILY MEDICINE CLINIC | Age: 46
End: 2021-03-12

## 2021-03-12 DIAGNOSIS — I10 ESSENTIAL HYPERTENSION: ICD-10-CM

## 2021-03-12 NOTE — TELEPHONE ENCOUNTER
calling stating a script for pt's BP cuff was sent to clinic pharmacy and their ins will not cover at the clinic pharmacy.  states it will be covered at [facility], can you end there?

## 2021-03-17 RX ORDER — BLOOD PRESSURE TEST KIT
KIT MISCELLANEOUS
Qty: 1 KIT | Refills: 0 | Status: SHIPPED | OUTPATIENT
Start: 2021-03-17

## 2021-03-26 DIAGNOSIS — G57.93 NEUROPATHIC PAIN OF BOTH LEGS: ICD-10-CM

## 2021-03-26 DIAGNOSIS — F32.A DEPRESSION, UNSPECIFIED DEPRESSION TYPE: ICD-10-CM

## 2021-03-29 RX ORDER — BUSPIRONE HYDROCHLORIDE 7.5 MG/1
7.5 TABLET ORAL 2 TIMES DAILY
Qty: 60 TABLET | Refills: 0 | Status: SHIPPED | OUTPATIENT
Start: 2021-03-29

## 2021-03-29 RX ORDER — CYCLOBENZAPRINE HCL 10 MG
TABLET ORAL
Qty: 30 TABLET | Refills: 0 | Status: SHIPPED | OUTPATIENT
Start: 2021-03-29

## 2021-03-29 RX ORDER — GABAPENTIN 300 MG/1
CAPSULE ORAL
Qty: 60 CAPSULE | Refills: 0 | OUTPATIENT
Start: 2021-03-29 | End: 2021-04-28

## 2021-04-03 ENCOUNTER — HOSPITAL ENCOUNTER (EMERGENCY)
Age: 46
Discharge: HOME | End: 2021-04-03
Attending: EMERGENCY MEDICINE
Payer: COMMERCIAL

## 2021-04-03 ENCOUNTER — APPOINTMENT (OUTPATIENT)
Dept: GENERAL RADIOLOGY | Age: 46
End: 2021-04-03
Payer: COMMERCIAL

## 2021-04-03 VITALS
WEIGHT: 198 LBS | HEART RATE: 109 BPM | SYSTOLIC BLOOD PRESSURE: 139 MMHG | RESPIRATION RATE: 18 BRPM | TEMPERATURE: 98.8 F | OXYGEN SATURATION: 98 % | DIASTOLIC BLOOD PRESSURE: 84 MMHG | HEIGHT: 64 IN | BODY MASS INDEX: 33.8 KG/M2

## 2021-04-03 DIAGNOSIS — J02.9 VIRAL PHARYNGITIS: Primary | ICD-10-CM

## 2021-04-03 LAB — POC STREP A ANTIGEN: NEGATIVE

## 2021-04-03 PROCEDURE — 87081 CULTURE SCREEN ONLY: CPT

## 2021-04-03 PROCEDURE — 87880 STREP A ASSAY W/OPTIC: CPT

## 2021-04-03 PROCEDURE — 70360 X-RAY EXAM OF NECK: CPT

## 2021-04-03 PROCEDURE — 99284 EMERGENCY DEPT VISIT MOD MDM: CPT

## 2021-04-03 PROCEDURE — 6370000000 HC RX 637 (ALT 250 FOR IP): Performed by: EMERGENCY MEDICINE

## 2021-04-03 RX ORDER — IBUPROFEN 800 MG/1
800 TABLET ORAL ONCE
Status: COMPLETED | OUTPATIENT
Start: 2021-04-03 | End: 2021-04-03

## 2021-04-03 RX ADMIN — IBUPROFEN 800 MG: 800 TABLET, FILM COATED ORAL at 19:28

## 2021-04-03 ASSESSMENT — ENCOUNTER SYMPTOMS
SORE THROAT: 1
BACK PAIN: 0
ABDOMINAL PAIN: 0
DIARRHEA: 0
NAUSEA: 0
VOMITING: 0
COUGH: 0
RHINORRHEA: 0
SHORTNESS OF BREATH: 1
CONSTIPATION: 0
PHOTOPHOBIA: 0

## 2021-04-03 ASSESSMENT — PAIN SCALES - GENERAL: PAINLEVEL_OUTOF10: 9

## 2021-04-03 ASSESSMENT — PAIN DESCRIPTION - LOCATION: LOCATION: THROAT

## 2021-04-03 ASSESSMENT — PAIN DESCRIPTION - PAIN TYPE: TYPE: ACUTE PAIN

## 2021-04-03 NOTE — ED PROVIDER NOTES
[facility] ED  [address]  [place] [address]  Phone: [phone]        Pt Name: Flaco Xie  MRN: [id number]  Melissa 1975  Date of evaluation: 4/3/21      CHIEF COMPLAINT     Chief Complaint   Patient presents with    Pharyngitis         HISTORY OF PRESENT ILLNESS  (Location/Symptom, Timing/Onset, Context/Setting, Quality, Duration, Modifying Factors, Severity.)    Flaco Xie is a 39 y.o. female who presents with a sore throat. She states she is having swallowing and wishes she could spit her saliva out. Only her right side is bothering her. She reports that her voice sounds fine. No fever. She does have chills. No nausea or vomiting. She reports myalgias. No sick contacts. No cough. REVIEW OF SYSTEMS    (2-9 systems for level 4, 10 or more for level 5)     Review of Systems   Constitutional: Positive for chills. Negative for fever. HENT: Positive for congestion and sore throat. Negative for ear discharge, ear pain and rhinorrhea. Eyes: Negative for photophobia and visual disturbance. Respiratory: Positive for shortness of breath. Negative for cough. Cardiovascular: Negative for chest pain and palpitations. Gastrointestinal: Negative for abdominal pain, constipation, diarrhea, nausea and vomiting. Genitourinary: Negative for dysuria, frequency and urgency. Musculoskeletal: Positive for neck pain. Negative for back pain. Skin: Negative for rash and wound. Neurological: Positive for dizziness, light-headedness and headaches. Hematological: Negative for adenopathy. Does not bruise/bleed easily. PAST MEDICAL HISTORY    has a past medical history of Anxiety, Chronic back pain, Depression, Hypertension, and Neuropathy. SURGICAL HISTORY      has a past surgical history that includes Hysterectomy, total abdominal and [place] tooth extraction.     Kathleen Keller [address]       Discharge Medication List as of 4/3/2021  8:50 PM      CONTINUE these medications which have NOT CHANGED    Details   busPIRone (BUSPAR) 7.5 MG tablet Take 1 tablet by mouth 2 times daily, Disp-60 tablet, R-0Normal      cyclobenzaprine (FLEXERIL) 10 MG tablet TAKE 1 TABLET BY MOUTH THREE TIMES A DAY AS NEEDED FOR MUSCLE SPASMS, Disp-30 tablet, R-0Normal      Blood Pressure KIT Disp-1 kit, R-0, PrintUse to check blood pressure daily. buPROPion (WELLBUTRIN XL) 300 MG extended release tablet TAKE ONE TABLET BY MOUTH EVERY MORNING, Disp-30 tablet, R-0Normal      ibuprofen (ADVIL;MOTRIN) 800 MG tablet TAKE ONE TABLET BY MOUTH EVERY 8 HOURS AS NEEDED FOR PAIN, Disp-90 tablet, R-0Normal      mirtazapine (REMERON) 15 MG tablet TAKE 1 TABLET BY MOUTH EVERY EVENING, Disp-30 tablet, R-1Normal      lisinopril (PRINIVIL;ZESTRIL) 10 MG tablet TAKE 1 AND 1/2 TABLET BY MOUTH ONE TIME DAILY, Disp-45 tablet, R-0Normal      gabapentin (NEURONTIN) 300 MG capsule TAKE ONE CAPSULE BY MOUTH 2 TIMES DAILY FOR 30 DAYS, Disp-60 capsule, R-2Normal      hydroCHLOROthiazide (HYDRODIURIL) 25 MG tablet Take 1 tablet by mouth every morning, Disp-30 tablet, R-3Normal      atorvastatin (LIPITOR) 10 MG tablet Take 1 tablet by mouth daily, Disp-30 tablet, R-1Normal      neomycin-bacitracin-polymyxin (NEOSPORIN) 5-400-5000 ointment Apply topically 4 times daily. , Disp-1 Tube,R-0, Normal      estrogens, conjugated, (PREMARIN) 0.625 MG tablet Take 1 tablet by mouth daily, Disp-90 tablet, R-3Normal      clindamycin (CLEOCIN) 2 % vaginal cream Place vaginally nightly for 7 nights, Disp-1 Tube,R-0Normal      prazosin (MINIPRESS) 1 MG capsule Take 1 capsule by mouth nightly, Disp-30 capsule, R-5Normal      rOPINIRole (REQUIP) 1 MG tablet TAKE 1 TABLET BY MOUTH EVERY NIGHT AT BEDTIME, Disp-30 tablet, R-0Normal      pantoprazole (PROTONIX) 40 MG tablet Take 1 tablet by mouth daily, Disp-30 tablet, R-11Normal      fluticasone (FLONASE) 50 MCG/ACT nasal spray 1 spray by Nasal route daily, Disp-1 Bottle, R-0Normal ALLERGIES     has No Known Allergies. FAMILY HISTORY     She indicated that her mother is alive. She indicated that her father is alive. She indicated that her sister is alive. She indicated that her brother is alive. She indicated that her maternal grandmother is . She indicated that her paternal grandmother is . She indicated that her paternal grandfather is . She indicated that the status of her neg hx is unknown.     family history includes Crohn's Disease in her mother; Heart Attack in her sister; Heart Disease in her father, maternal grandmother, paternal grandfather, and paternal grandmother; Heart Failure in her brother; Heart Surgery in her father; Hypertension in her brother, father, mother, and sister. SOCIAL HISTORY      reports that she has been smoking cigarettes. She has a 15.00 pack-year smoking history. She has never used smokeless tobacco. She reports current alcohol use. She reports that she does not use drugs. PHYSICAL EXAM    (up to 7 for level 4, 8 or more for level 5)   INITIAL VITALS:  height is 5' 4\" (1.626 m) and weight is 89.8 kg (198 lb). Her tympanic temperature is 98.8 °F (37.1 °C). Her blood pressure is 139/84 and her pulse is 109. Her respiration is 18 and oxygen saturation is 98%. Physical Exam  Vitals signs and nursing note reviewed. Constitutional:       General: She is not in acute distress. Appearance: She is well-developed. She is ill-appearing. She is not toxic-appearing or diaphoretic. HENT:      Head: Normocephalic and atraumatic. Right Ear: Tympanic membrane and ear canal normal. No drainage, swelling or tenderness. No middle ear effusion. Tympanic membrane is not erythematous. Left Ear: Tympanic membrane and ear canal normal. No drainage, swelling or tenderness. No middle ear effusion. Tympanic membrane is not erythematous. Nose: No rhinorrhea.       Mouth/Throat:      Mouth: Mucous membranes are moist. No radiographic images are visualized and the radiologist interpretations are reviewed as follows:         Interpretation per the Radiologist below, if available at the time of this note:    pending    LABS:  Results for orders placed or performed during the hospital encounter of 04/03/21   POC Strep A Antigen   Result Value Ref Range    POC Strep A Antigen NEGATIVE NEGATIVE         EMERGENCY DEPARTMENT COURSE:   Vitals:    Vitals:    04/03/21 1858 04/03/21 2055   BP: (!) 143/97 139/84   Pulse: 118 109   Resp: 16 18   Temp: 98.8 °F (37.1 °C)    TempSrc: Tympanic    SpO2: 98%    Weight: 89.8 kg (198 lb)    Height: 5' 4\" (1.626 m)      -------------------------  BP: 139/84, Temp: 98.8 °F (37.1 °C), Pulse: 109, Resp: 18          CONSULTS:  none    PROCEDURES:  None    FINAL IMPRESSION      1. Viral pharyngitis          DISPOSITION/PLAN   DISPOSITION Decision To Discharge 04/03/2021 08:49:34 PM      CONDITION ON DISPOSITION:   Stable     PATIENT REFERRED TO:  TAMARA Tse CNP  [facility] [address] [address]  [phone]    Call in 2 days  For reevaluation of current symptoms    [address] [place] [address]  [address]  [phone]    If symptoms worsen      DISCHARGE MEDICATIONS:  Discharge Medication List as of 4/3/2021  8:50 PM          (Please note that portions of this note were completed with a voicerecognition program.  Efforts were made to edit the dictations but occasionally words are mis-transcribed.)    Taylor MD, F.A.C.E.P.   Attending Emergency Medicine Physician        Marshall Bernard MD  04/04/21 5865

## 2021-04-04 NOTE — ED NOTES
Patient is alert and appropriate for age and condition at time of discharge. Patient states she will check her mychart in about 48 hours for strep culture and follow up with her PCP for the results. Patient also states she will use hot and cold fluids to sooth throat until it is better.      Jennifer Dumont RN  04/03/21 7904

## 2021-04-04 NOTE — ED PROVIDER NOTES
ADDENDUM:      Care of this patient was assumed from Dr. Salty Benites. The patient was seen for Pharyngitis  . The patient's initial evaluation and plan have been discussed with the prior provider who initially evaluated the patient. Nursing Notes, Past Medical Hx, Past Surgical Hx, Social Hx, Allergies, and Family Hx were all reviewed. Diagnostic Results       RADIOLOGY:   Non-plain film images such as CT, Ultrasound and MRI are read by the radiologist. Dinesh Mueller radiographic images are visualized and the radiologist interpretations are reviewed as follows:     Xr Neck Soft Tissue    Result Date: 4/3/2021  EXAMINATION: TWO XRAY VIEWS OF THE NECK SOFT TISSUES 4/3/2021 7:19 pm COMPARISON: None. HISTORY: ORDERING SYSTEM PROVIDED HISTORY: throat pain TECHNOLOGIST PROVIDED HISTORY: throat pain Reason for Exam: throat pain beginning this morning Acuity: Acute Type of Exam: Initial FINDINGS: No radiopaque foreign bodies visualized. The upper aerodigestive mucosa is unremarkable and widely patent. The visualized bony structures do not demonstrate acute abnormality. No acute abnormality within the neck soft tissues. LABS:   Results for orders placed or performed during the hospital encounter of 04/03/21   POC Strep A Antigen   Result Value Ref Range    POC Strep A Antigen NEGATIVE NEGATIVE       RECENT VITALS:  BP: (!) 143/97, Temp: 98.8 °F (37.1 °C), Pulse: 118, Resp: 16     ED Course     The patient was given the following medications:  Orders Placed This Encounter   Medications    ibuprofen (ADVIL;MOTRIN) tablet 800 mg     During the emergency department course, patient was given Motrin 800 mg orally. Medical Decision Making      26-year-old female patient presents to the emergency department complaining of sore throat. Upon reevaluation, patient is resting comfortably and does not appear to be in any pain or any respiratory distress. She is handling her secretions normally and phonating normally.   Her oropharynx is mildly erythematous but uvula is in the midline and airway is patent. There is no cervical lymphadenopathy. Meningeal signs are negative. Lungs are clear to auscultation. Rhythm is regular without murmurs. Abdomen is soft and nontender with active bowel sounds. Rapid strep screen is negative and soft tissue x-rays of the neck are unremarkable. Patient was reassured and discharged home with instructions drink plenty of oral fluids, take Tylenol and ibuprofen as needed for the pain, follow-up with PCP, return if worse. Disposition     FINAL IMPRESSION      1. Viral pharyngitis          DISPOSITION/PLAN   DISPOSITION Decision To Discharge 04/03/2021 08:49:34 PM      PATIENT REFERRED TO:  TAMARA White CNP  [place] [id number] [phone]    Call in 2 days  For reevaluation of current symptoms    [facility] ED  [facility] [address].  [phone]    If symptoms worsen      DISCHARGE MEDICATIONS:  New Prescriptions    No medications on file             (Please note that portions of this note were completed with a voice recognition program.  Efforts were made to edit the dictations but occasionally words are mis-transcribed.)    Balderas MD, F.A.C.E.P.   Attending Emergency Medicine Physician                Emmett Nicole MD  04/04/21 6482

## 2021-04-05 DIAGNOSIS — M54.41 CHRONIC MIDLINE LOW BACK PAIN WITH BILATERAL SCIATICA: ICD-10-CM

## 2021-04-05 DIAGNOSIS — G25.81 RESTLESS LEG SYNDROME: ICD-10-CM

## 2021-04-05 DIAGNOSIS — M54.42 CHRONIC MIDLINE LOW BACK PAIN WITH BILATERAL SCIATICA: ICD-10-CM

## 2021-04-05 DIAGNOSIS — E78.2 MIXED HYPERLIPIDEMIA: ICD-10-CM

## 2021-04-05 DIAGNOSIS — I10 HYPERTENSION, UNSPECIFIED TYPE: ICD-10-CM

## 2021-04-05 DIAGNOSIS — G89.29 CHRONIC MIDLINE LOW BACK PAIN WITH BILATERAL SCIATICA: ICD-10-CM

## 2021-04-05 RX ORDER — BUPROPION HYDROCHLORIDE 150 MG/1
TABLET ORAL
Qty: 15 TABLET | Refills: 0 | OUTPATIENT
Start: 2021-04-05

## 2021-04-05 RX ORDER — OMEPRAZOLE 40 MG/1
CAPSULE, DELAYED RELEASE ORAL
Qty: 30 CAPSULE | Refills: 0 | OUTPATIENT
Start: 2021-04-05

## 2021-04-05 RX ORDER — IBUPROFEN 800 MG/1
TABLET ORAL
Qty: 90 TABLET | Refills: 0 | OUTPATIENT
Start: 2021-04-05

## 2021-04-05 RX ORDER — LISINOPRIL 10 MG/1
TABLET ORAL
Qty: 45 TABLET | Refills: 0 | OUTPATIENT
Start: 2021-04-05

## 2021-04-05 RX ORDER — SUCRALFATE 1 G/1
TABLET ORAL
Qty: 60 TABLET | Refills: 0 | OUTPATIENT
Start: 2021-04-05

## 2021-04-05 RX ORDER — ATORVASTATIN CALCIUM 10 MG/1
TABLET, FILM COATED ORAL
Qty: 15 TABLET | Refills: 0 | OUTPATIENT
Start: 2021-04-05

## 2021-04-05 NOTE — TELEPHONE ENCOUNTER
Fax received from [facility]  is requesting a refill on the following medication(s):  Requested Prescriptions     Pending Prescriptions Disp Refills    lisinopril (PRINIVIL;ZESTRIL) 10 MG tablet 45 tablet 0     Sig: TAKE 1 AND 1/2 TABLET BY MOUTH ONE TIME DAILY    ibuprofen (ADVIL;MOTRIN) 800 MG tablet 90 tablet 0       Last Visit Date (If Applicable):  3/0/76    Next Visit Date:    Visit date not found

## 2021-04-06 LAB
CULTURE: NORMAL
CULTURE: NORMAL
Lab: NORMAL
SPECIMEN DESCRIPTION: NORMAL

## 2021-04-27 ENCOUNTER — TELEPHONE (OUTPATIENT)
Dept: FAMILY MEDICINE CLINIC | Age: 46
End: 2021-04-27

## 2021-04-28 NOTE — DISCHARGE SUMMARY
OCCUPATIONAL THERAPY        NOTE: This patient did not return for the recommended follow-up after their initial evaluation. Due to no further follow-up visits, there is no further testing or assessment that can be made beyond that which is included in the OT eval.  This patient is past 30 days, and is considered non-compliant with OT and will be discharged from occupational therapy services.

## 2021-04-30 DIAGNOSIS — F32.A DEPRESSION, UNSPECIFIED DEPRESSION TYPE: ICD-10-CM

## 2021-04-30 DIAGNOSIS — E78.2 MIXED HYPERLIPIDEMIA: ICD-10-CM

## 2021-04-30 DIAGNOSIS — F51.01 PRIMARY INSOMNIA: ICD-10-CM

## 2021-04-30 RX ORDER — BUPROPION HYDROCHLORIDE 150 MG/1
TABLET ORAL
Qty: 15 TABLET | Refills: 0 | OUTPATIENT
Start: 2021-04-30

## 2021-04-30 RX ORDER — ATORVASTATIN CALCIUM 10 MG/1
10 TABLET, FILM COATED ORAL DAILY
Qty: 30 TABLET | Refills: 1 | OUTPATIENT
Start: 2021-04-30

## 2021-04-30 RX ORDER — SUCRALFATE 1 G/1
TABLET ORAL
Qty: 60 TABLET | Refills: 0 | OUTPATIENT
Start: 2021-04-30

## 2021-04-30 RX ORDER — CYCLOBENZAPRINE HCL 10 MG
TABLET ORAL
Qty: 30 TABLET | Refills: 0 | OUTPATIENT
Start: 2021-04-30

## 2021-04-30 RX ORDER — BUSPIRONE HYDROCHLORIDE 7.5 MG/1
TABLET ORAL
Qty: 60 TABLET | Refills: 0 | OUTPATIENT
Start: 2021-04-30

## 2021-04-30 RX ORDER — OMEPRAZOLE 40 MG/1
CAPSULE, DELAYED RELEASE ORAL
Qty: 30 CAPSULE | Refills: 0 | OUTPATIENT
Start: 2021-04-30

## 2021-04-30 RX ORDER — MIRTAZAPINE 15 MG/1
TABLET, FILM COATED ORAL
Qty: 30 TABLET | Refills: 1 | OUTPATIENT
Start: 2021-04-30

## 2021-04-30 NOTE — TELEPHONE ENCOUNTER
Dejon Bethea called requesting a refill of the below medication which has been pended for you: patient has cancelled or no showed 2 new patient appts with Dr Janey Andujar. Declined medications.     Requested Prescriptions     Pending Prescriptions Disp Refills    cyclobenzaprine (FLEXERIL) 10 MG tablet [Pharmacy Med Name: CYCLOBENZAPRINE HCL 10MG TABS] 30 tablet 0     Sig: TAKE 1 TABLET BY MOUTH 3 TIMES A DAY AS NEEDED FOR MUSCLE SPASMS    busPIRone (BUSPAR) 7.5 MG tablet [Pharmacy Med Name: BUSPIRONE HCL 7.5MG TABS] 60 tablet 0     Sig: TAKE 1 TABLET BY MOUTH 2 TIMES A DAY       Last Appointment Date: 09/03/2020  Next Appointment Date: Visit date not found    No Known Allergies

## 2021-10-26 ENCOUNTER — HOSPITAL ENCOUNTER (EMERGENCY)
Age: 46
Discharge: HOME | End: 2021-10-26
Attending: EMERGENCY MEDICINE
Payer: COMMERCIAL

## 2021-10-26 ENCOUNTER — APPOINTMENT (OUTPATIENT)
Dept: CT IMAGING | Age: 46
End: 2021-10-26
Payer: COMMERCIAL

## 2021-10-26 VITALS
SYSTOLIC BLOOD PRESSURE: 126 MMHG | HEART RATE: 102 BPM | DIASTOLIC BLOOD PRESSURE: 79 MMHG | WEIGHT: 198 LBS | TEMPERATURE: 99.5 F | BODY MASS INDEX: 33.8 KG/M2 | OXYGEN SATURATION: 97 % | HEIGHT: 64 IN | RESPIRATION RATE: 16 BRPM

## 2021-10-26 DIAGNOSIS — J18.9 PNEUMONIA DUE TO INFECTIOUS ORGANISM, UNSPECIFIED LATERALITY, UNSPECIFIED PART OF LUNG: Primary | ICD-10-CM

## 2021-10-26 LAB
ABSOLUTE EOS #: 0.11 K/UL (ref 0–0.44)
ABSOLUTE IMMATURE GRANULOCYTE: 0.04 K/UL (ref 0–0.3)
ABSOLUTE LYMPH #: 1.54 K/UL (ref 1.1–3.7)
ABSOLUTE MONO #: 0.45 K/UL (ref 0.1–1.2)
ALBUMIN SERPL-MCNC: 4.1 G/DL (ref 3.5–5.2)
ALBUMIN/GLOBULIN RATIO: 1.2 (ref 1–2.5)
ALP BLD-CCNC: 110 U/L (ref 35–104)
ALT SERPL-CCNC: 23 U/L (ref 5–33)
ANION GAP SERPL CALCULATED.3IONS-SCNC: 13 MMOL/L (ref 9–17)
AST SERPL-CCNC: 17 U/L
BASOPHILS # BLD: 1 % (ref 0–2)
BASOPHILS ABSOLUTE: 0.03 K/UL (ref 0–0.2)
BILIRUB SERPL-MCNC: 0.19 MG/DL (ref 0.3–1.2)
BUN BLDV-MCNC: 16 MG/DL (ref 6–20)
BUN/CREAT BLD: 19 (ref 9–20)
CALCIUM SERPL-MCNC: 9 MG/DL (ref 8.6–10.4)
CHLORIDE BLD-SCNC: 94 MMOL/L (ref 98–107)
CO2: 26 MMOL/L (ref 20–31)
CREAT SERPL-MCNC: 0.83 MG/DL (ref 0.5–0.9)
D-DIMER QUANTITATIVE: 0.58 MG/L FEU (ref 0–0.59)
DIFFERENTIAL TYPE: ABNORMAL
DIRECT EXAM: NORMAL
EOSINOPHILS RELATIVE PERCENT: 2 % (ref 1–4)
GFR AFRICAN AMERICAN: >60 ML/MIN
GFR NON-AFRICAN AMERICAN: >60 ML/MIN
GFR SERPL CREATININE-BSD FRML MDRD: ABNORMAL ML/MIN/{1.73_M2}
GFR SERPL CREATININE-BSD FRML MDRD: ABNORMAL ML/MIN/{1.73_M2}
GLUCOSE BLD-MCNC: 97 MG/DL (ref 70–99)
HCT VFR BLD CALC: 40.3 % (ref 36.3–47.1)
HEMOGLOBIN: 13 G/DL (ref 11.9–15.1)
IMMATURE GRANULOCYTES: 1 %
LACTIC ACID: 1.4 MMOL/L (ref 0.5–2.2)
LYMPHOCYTES # BLD: 24 % (ref 24–43)
Lab: NORMAL
MCH RBC QN AUTO: 28.6 PG (ref 25.2–33.5)
MCHC RBC AUTO-ENTMCNC: 32.3 G/DL (ref 25.2–33.5)
MCV RBC AUTO: 88.6 FL (ref 82.6–102.9)
MONOCYTES # BLD: 7 % (ref 3–12)
NRBC AUTOMATED: 0 PER 100 WBC
PDW BLD-RTO: 13.9 % (ref 11.8–14.4)
PLATELET # BLD: 363 K/UL (ref 138–453)
PLATELET ESTIMATE: ABNORMAL
PMV BLD AUTO: 8.4 FL (ref 8.1–13.5)
POTASSIUM SERPL-SCNC: 2.9 MMOL/L (ref 3.7–5.3)
RBC # BLD: 4.55 M/UL (ref 3.95–5.11)
RBC # BLD: ABNORMAL 10*6/UL
SARS-COV-2, RAPID: NOT DETECTED
SEG NEUTROPHILS: 65 % (ref 36–65)
SEGMENTED NEUTROPHILS ABSOLUTE COUNT: 4.23 K/UL (ref 1.5–8.1)
SODIUM BLD-SCNC: 133 MMOL/L (ref 135–144)
SPECIMEN DESCRIPTION: NORMAL
SPECIMEN DESCRIPTION: NORMAL
TOTAL PROTEIN: 7.6 G/DL (ref 6.4–8.3)
TROPONIN INTERP: NORMAL
TROPONIN T: NORMAL NG/ML
TROPONIN, HIGH SENSITIVITY: 11 NG/L (ref 0–14)
WBC # BLD: 6.4 K/UL (ref 3.5–11.3)
WBC # BLD: ABNORMAL 10*3/UL

## 2021-10-26 PROCEDURE — 6370000000 HC RX 637 (ALT 250 FOR IP): Performed by: EMERGENCY MEDICINE

## 2021-10-26 PROCEDURE — 87635 SARS-COV-2 COVID-19 AMP PRB: CPT

## 2021-10-26 PROCEDURE — 93005 ELECTROCARDIOGRAM TRACING: CPT | Performed by: EMERGENCY MEDICINE

## 2021-10-26 PROCEDURE — 2709999900 CT CHEST PULMONARY EMBOLISM W CONTRAST

## 2021-10-26 PROCEDURE — 87804 INFLUENZA ASSAY W/OPTIC: CPT

## 2021-10-26 PROCEDURE — 6360000002 HC RX W HCPCS: Performed by: EMERGENCY MEDICINE

## 2021-10-26 PROCEDURE — 99285 EMERGENCY DEPT VISIT HI MDM: CPT

## 2021-10-26 PROCEDURE — 94640 AIRWAY INHALATION TREATMENT: CPT

## 2021-10-26 PROCEDURE — 85025 COMPLETE CBC W/AUTO DIFF WBC: CPT

## 2021-10-26 PROCEDURE — 83605 ASSAY OF LACTIC ACID: CPT

## 2021-10-26 PROCEDURE — 85379 FIBRIN DEGRADATION QUANT: CPT

## 2021-10-26 PROCEDURE — 96374 THER/PROPH/DIAG INJ IV PUSH: CPT

## 2021-10-26 PROCEDURE — 6370000000 HC RX 637 (ALT 250 FOR IP)

## 2021-10-26 PROCEDURE — 80053 COMPREHEN METABOLIC PANEL: CPT

## 2021-10-26 PROCEDURE — 6360000004 HC RX CONTRAST MEDICATION: Performed by: EMERGENCY MEDICINE

## 2021-10-26 PROCEDURE — 36415 COLL VENOUS BLD VENIPUNCTURE: CPT

## 2021-10-26 PROCEDURE — 84484 ASSAY OF TROPONIN QUANT: CPT

## 2021-10-26 RX ORDER — IPRATROPIUM BROMIDE AND ALBUTEROL SULFATE 2.5; .5 MG/3ML; MG/3ML
SOLUTION RESPIRATORY (INHALATION)
Status: COMPLETED
Start: 2021-10-26 | End: 2021-10-26

## 2021-10-26 RX ORDER — ALBUTEROL SULFATE 90 UG/1
2 AEROSOL, METERED RESPIRATORY (INHALATION) EVERY 6 HOURS PRN
Status: DISCONTINUED | OUTPATIENT
Start: 2021-10-26 | End: 2021-10-26 | Stop reason: HOSPADM

## 2021-10-26 RX ORDER — AMOXICILLIN AND CLAVULANATE POTASSIUM 875; 125 MG/1; MG/1
1 TABLET, FILM COATED ORAL ONCE
Status: COMPLETED | OUTPATIENT
Start: 2021-10-26 | End: 2021-10-26

## 2021-10-26 RX ORDER — METHYLPREDNISOLONE SODIUM SUCCINATE 125 MG/2ML
125 INJECTION, POWDER, LYOPHILIZED, FOR SOLUTION INTRAMUSCULAR; INTRAVENOUS ONCE
Status: COMPLETED | OUTPATIENT
Start: 2021-10-26 | End: 2021-10-26

## 2021-10-26 RX ORDER — AMOXICILLIN AND CLAVULANATE POTASSIUM 875; 125 MG/1; MG/1
1 TABLET, FILM COATED ORAL 2 TIMES DAILY
Qty: 20 TABLET | Refills: 0 | Status: SHIPPED | OUTPATIENT
Start: 2021-10-26 | End: 2021-11-05

## 2021-10-26 RX ORDER — IPRATROPIUM BROMIDE AND ALBUTEROL SULFATE 2.5; .5 MG/3ML; MG/3ML
1 SOLUTION RESPIRATORY (INHALATION) ONCE
Status: COMPLETED | OUTPATIENT
Start: 2021-10-26 | End: 2021-10-26

## 2021-10-26 RX ORDER — IBUPROFEN 800 MG/1
800 TABLET ORAL ONCE
Status: COMPLETED | OUTPATIENT
Start: 2021-10-26 | End: 2021-10-26

## 2021-10-26 RX ADMIN — POTASSIUM BICARBONATE 40 MEQ: 782 TABLET, EFFERVESCENT ORAL at 20:34

## 2021-10-26 RX ADMIN — METHYLPREDNISOLONE SODIUM SUCCINATE 125 MG: 125 INJECTION, POWDER, FOR SOLUTION INTRAMUSCULAR; INTRAVENOUS at 19:03

## 2021-10-26 RX ADMIN — IBUPROFEN 800 MG: 800 TABLET, FILM COATED ORAL at 19:45

## 2021-10-26 RX ADMIN — IOPAMIDOL 100 ML: 755 INJECTION, SOLUTION INTRAVENOUS at 19:33

## 2021-10-26 RX ADMIN — ALBUTEROL SULFATE 2 PUFF: 108 AEROSOL, METERED RESPIRATORY (INHALATION) at 20:29

## 2021-10-26 RX ADMIN — IPRATROPIUM BROMIDE AND ALBUTEROL SULFATE 1 AMPULE: .5; 3 SOLUTION RESPIRATORY (INHALATION) at 18:46

## 2021-10-26 RX ADMIN — IPRATROPIUM BROMIDE AND ALBUTEROL SULFATE 1 AMPULE: 2.5; .5 SOLUTION RESPIRATORY (INHALATION) at 18:46

## 2021-10-26 RX ADMIN — AMOXICILLIN AND CLAVULANATE POTASSIUM 1 TABLET: 875; 125 TABLET, FILM COATED ORAL at 20:34

## 2021-10-26 ASSESSMENT — PAIN SCALES - GENERAL: PAINLEVEL_OUTOF10: 8

## 2021-10-26 ASSESSMENT — ENCOUNTER SYMPTOMS
SHORTNESS OF BREATH: 1
COUGH: 1
ABDOMINAL PAIN: 0
NAUSEA: 0
DIARRHEA: 0
CHEST TIGHTNESS: 1
EYE PAIN: 0
BACK PAIN: 0
VOMITING: 0

## 2021-10-26 ASSESSMENT — PAIN DESCRIPTION - LOCATION: LOCATION: CHEST

## 2021-10-26 ASSESSMENT — PAIN DESCRIPTION - DESCRIPTORS: DESCRIPTORS: TIGHTNESS

## 2021-10-26 ASSESSMENT — PAIN DESCRIPTION - FREQUENCY: FREQUENCY: CONTINUOUS

## 2021-10-26 NOTE — ED PROVIDER NOTES
[facility]  eMERGENCY dEPARTMENT eNCOUnter      Pt Name: Abiodun Robertson  MRN: [id number]  [place] 1975  Date of evaluation: 10/26/2021      CHIEF COMPLAINT       Chief Complaint   Patient presents with    Cough     started last night          HISTORY OF PRESENT ILLNESS    Abiodun Robertson is a 39 y.o. female who presents with complaints of cough and shortness of breath as well as chest pain and tightness that started last night patient was a smoker who recently stopped patient states that she had Covid several months ago this feels a little bit different more chest tightness she is coughing some stuff up she has some chills no nausea vomiting no diarrhea no abdominal pain      REVIEW OF SYSTEMS         Review of Systems   Constitutional: Positive for chills. Negative for fever. HENT: Negative for congestion and ear pain. Eyes: Negative for pain and visual disturbance. Respiratory: Positive for cough, chest tightness and shortness of breath. Cardiovascular: Positive for chest pain. Negative for palpitations and leg swelling. Gastrointestinal: Negative for abdominal pain, diarrhea, nausea and vomiting. Endocrine: Negative for polydipsia and polyuria. Genitourinary: Negative for difficulty urinating, dysuria and frequency. Musculoskeletal: Negative for back pain, joint swelling, myalgias, neck pain and neck stiffness. Skin: Negative for rash. Neurological: Negative for dizziness, weakness and headaches. Hematological: Negative for adenopathy. Does not bruise/bleed easily. Psychiatric/Behavioral: Negative for confusion, self-injury and suicidal ideas. PAST MEDICAL HISTORY    has a past medical history of Anxiety, Chronic back pain, Depression, Hypertension, and Neuropathy. SURGICAL HISTORY      has a past surgical history that includes Hysterectomy, total abdominal and [place] tooth extraction.     CURRENT MEDICATIONS       Previous Medications    ATORVASTATIN (LIPITOR) 10 MG TABLET    Take 1 tablet by mouth daily    BLOOD PRESSURE KIT    Use to check blood pressure daily. BUPROPION (WELLBUTRIN XL) 300 MG EXTENDED RELEASE TABLET    TAKE ONE TABLET BY MOUTH EVERY MORNING    BUSPIRONE (BUSPAR) 7.5 MG TABLET    Take 1 tablet by mouth 2 times daily    CLINDAMYCIN (CLEOCIN) 2 % VAGINAL CREAM    Place vaginally nightly for 7 nights    CYCLOBENZAPRINE (FLEXERIL) 10 MG TABLET    TAKE 1 TABLET BY MOUTH THREE TIMES A DAY AS NEEDED FOR MUSCLE SPASMS    ESTROGENS, CONJUGATED, (PREMARIN) 0.625 MG TABLET    Take 1 tablet by mouth daily    FLUTICASONE (FLONASE) 50 MCG/ACT NASAL SPRAY    1 spray by Nasal route daily    GABAPENTIN (NEURONTIN) 300 MG CAPSULE    TAKE ONE CAPSULE BY MOUTH 2 TIMES DAILY FOR 30 DAYS    HYDROCHLOROTHIAZIDE (HYDRODIURIL) 25 MG TABLET    Take 1 tablet by mouth every morning    IBUPROFEN (ADVIL;MOTRIN) 800 MG TABLET    TAKE ONE TABLET BY MOUTH EVERY 8 HOURS AS NEEDED FOR PAIN    LISINOPRIL (PRINIVIL;ZESTRIL) 10 MG TABLET    TAKE 1 AND 1/2 TABLET BY MOUTH ONE TIME DAILY    MIRTAZAPINE (REMERON) 15 MG TABLET    TAKE 1 TABLET BY MOUTH EVERY EVENING    NEOMYCIN-BACITRACIN-POLYMYXIN (NEOSPORIN) 5-400-5000 OINTMENT    Apply topically 4 times daily. PANTOPRAZOLE (PROTONIX) 40 MG TABLET    Take 1 tablet by mouth daily    PRAZOSIN (MINIPRESS) 1 MG CAPSULE    Take 1 capsule by mouth nightly    ROPINIROLE (REQUIP) 1 MG TABLET    TAKE 1 TABLET BY MOUTH EVERY NIGHT AT BEDTIME       ALLERGIES     has No Known Allergies. FAMILY HISTORY     She indicated that her mother is alive. She indicated that her father is alive. She indicated that her sister is alive. She indicated that her brother is alive. She indicated that her maternal grandmother is . She indicated that her paternal grandmother is . She indicated that her paternal grandfather is .  She indicated that the status of her neg hx is unknown.     family history includes Crohn's Disease in her mother; Heart Attack in her sister; Heart Disease in her father, maternal grandmother, paternal grandfather, and paternal grandmother; Heart Failure in her brother; Heart Surgery in her father; Hypertension in her brother, father, mother, and sister. SOCIAL HISTORY      reports that she has been smoking cigarettes. She has a 15.00 pack-year smoking history. She has never used smokeless tobacco. She reports current alcohol use. She reports that she does not use drugs. PHYSICAL EXAM     INITIAL VITALS:  height is 5' 4\" (1.626 m) and weight is 198 lb (89.8 kg). Her tympanic temperature is 99.5 °F (37.5 °C). Her blood pressure is 128/80 and her pulse is 113. Her respiration is 16 and oxygen saturation is 97%. Physical Exam  Constitutional:       Appearance: Normal appearance. She is well-developed. She is not ill-appearing. HENT:      Head: Normocephalic and atraumatic. Right Ear: External ear normal.      Left Ear: External ear normal.   Eyes:      Conjunctiva/sclera: Conjunctivae normal.      Pupils: Pupils are equal, round, and reactive to light. Cardiovascular:      Rate and Rhythm: Normal rate and regular rhythm. Pulmonary:      Effort: Pulmonary effort is normal. No respiratory distress. Breath sounds: Wheezing present. Abdominal:      General: Bowel sounds are normal.      Palpations: Abdomen is soft. Musculoskeletal:         General: No tenderness. Normal range of motion. Cervical back: Normal range of motion. Right lower leg: No edema. Left lower leg: No edema. Skin:     General: Skin is warm and dry. Neurological:      General: No focal deficit present. Mental Status: She is alert and oriented to person, place, and time.    Psychiatric:         Behavior: Behavior normal.           DIFFERENTIAL DIAGNOSIS/ MDM:     Pneumonia rule out Covid rule out influenza rule out PE    DIAGNOSTIC RESULTS     EKG: All EKG's are interpreted by the Emergency Department Physician who either signs or Co-signs this chart in the absence of a cardiologist.    Sinus tachycardia rate of 104 bpm FL was 156 ms QRS durations 82 ms QT corrected slightly prolonged at 536 ms axis is 27 there is no acute ST or T wave changes he has a fair amount of amount of artifact she does have LVH    RADIOLOGY:   I directly visualized the following  images and reviewed the radiologist interpretations:          ED BEDSIDE ULTRASOUND:       LABS:  Labs Reviewed   COMPREHENSIVE METABOLIC PANEL - Abnormal; Notable for the following components:       Result Value    Sodium 133 (*)     Chloride 94 (*)     Alkaline Phosphatase 110 (*)     Total Bilirubin 0.19 (*)     All other components within normal limits   CBC WITH AUTO DIFFERENTIAL - Abnormal; Notable for the following components:    Immature Granulocytes 1 (*)     All other components within normal limits   RAPID INFLUENZA A/B ANTIGENS   COVID-19, RAPID   LACTIC ACID   D-DIMER, QUANTITATIVE   TROPONIN   TROPONIN           EMERGENCY DEPARTMENT COURSE:   Vitals:    Vitals:    10/26/21 1724   BP: 128/80   Pulse: 113   Resp: 16   Temp: 99.5 °F (37.5 °C)   TempSrc: Tympanic   SpO2: 97%   Weight: 198 lb (89.8 kg)   Height: 5' 4\" (1.626 m)     -------------------------  BP: 128/80, Temp: 99.5 °F (37.5 °C), Pulse: 113, Resp: 16        Re-evaluation Notes        CRITICAL CARE:   None        CONSULTS:      PROCEDURES:  None    FINAL IMPRESSION    No diagnosis found. DISPOSITION/PLAN   DISPOSITION care of this patient is passed to the oncoming physician at the end of my shift 1930 hrs. pending results    Condition on Disposition    Stable    PATIENT REFERRED TO:  No follow-up provider specified.     DISCHARGE MEDICATIONS:  New Prescriptions    No medications on file       (Please note that portions of this note were completed with a voice recognition program.  Efforts were made to edit the dictations but occasionally words are mis-transcribed.)    Donal Rene MD,,

## 2021-10-27 ENCOUNTER — CARE COORDINATION (OUTPATIENT)
Dept: CARE COORDINATION | Age: 46
End: 2021-10-27

## 2021-10-27 LAB
EKG ATRIAL RATE: 104 BPM
EKG P AXIS: 59 DEGREES
EKG P-R INTERVAL: 156 MS
EKG Q-T INTERVAL: 408 MS
EKG QRS DURATION: 82 MS
EKG QTC CALCULATION (BAZETT): 536 MS
EKG R AXIS: 27 DEGREES
EKG T AXIS: 91 DEGREES
EKG VENTRICULAR RATE: 104 BPM

## 2021-10-27 NOTE — PROGRESS NOTES
Inhaler Education        [x] Served spacer    [x] Provided Instruction on proper use of Inhaler and technique  [x] Good return demonstration per patient   [] Other:    Marie Yao RCP   8:32 PM

## 2021-10-27 NOTE — CARE COORDINATION
Unable to reach patient for Covid /ED outreach. Home phone listed, call cannot be completed as dialed, cell phone listed is not in service. No other phone #'s available to call. Writer will close encounter.

## 2021-10-27 NOTE — ED PROVIDER NOTES
[facility] ED  [address]  Phone: [phone]        ADDENDUM:      Care of this patient was assumed from to [facility]. The patient was seen for Cough (started last night )  . The patient's initial evaluation and plan have been discussed with the prior provider who initially evaluated the patient. Nursing Notes, Past Medical Hx, Past Surgical Hx, Allergies, were all reviewed. PAST MEDICAL HISTORY    has a past medical history of Anxiety, Chronic back pain, Depression, Hypertension, and Neuropathy. SURGICAL HISTORY      has a past surgical history that includes Hysterectomy, total abdominal and [place] tooth extraction. CURRENT MEDICATIONS       Previous Medications    ATORVASTATIN (LIPITOR) 10 MG TABLET    Take 1 tablet by mouth daily    BLOOD PRESSURE KIT    Use to check blood pressure daily.     BUPROPION (WELLBUTRIN XL) 300 MG EXTENDED RELEASE TABLET    TAKE ONE TABLET BY MOUTH EVERY MORNING    BUSPIRONE (BUSPAR) 7.5 MG TABLET    Take 1 tablet by mouth 2 times daily    CLINDAMYCIN (CLEOCIN) 2 % VAGINAL CREAM    Place vaginally nightly for 7 nights    CYCLOBENZAPRINE (FLEXERIL) 10 MG TABLET    TAKE 1 TABLET BY MOUTH THREE TIMES A DAY AS NEEDED FOR MUSCLE SPASMS    ESTROGENS, CONJUGATED, (PREMARIN) 0.625 MG TABLET    Take 1 tablet by mouth daily    FLUTICASONE (FLONASE) 50 MCG/ACT NASAL SPRAY    1 spray by Nasal route daily    GABAPENTIN (NEURONTIN) 300 MG CAPSULE    TAKE ONE CAPSULE BY MOUTH 2 TIMES DAILY FOR 30 DAYS    HYDROCHLOROTHIAZIDE (HYDRODIURIL) 25 MG TABLET    Take 1 tablet by mouth every morning    IBUPROFEN (ADVIL;MOTRIN) 800 MG TABLET    TAKE ONE TABLET BY MOUTH EVERY 8 HOURS AS NEEDED FOR PAIN    LISINOPRIL (PRINIVIL;ZESTRIL) 10 MG TABLET    TAKE 1 AND 1/2 TABLET BY MOUTH ONE TIME DAILY    MIRTAZAPINE (REMERON) 15 MG TABLET    TAKE 1 TABLET BY MOUTH EVERY EVENING    NEOMYCIN-BACITRACIN-POLYMYXIN (NEOSPORIN) 5-400-5000 OINTMENT    Apply topically 4 times daily. PANTOPRAZOLE (PROTONIX) 40 MG TABLET    Take 1 tablet by mouth daily    PRAZOSIN (MINIPRESS) 1 MG CAPSULE    Take 1 capsule by mouth nightly    ROPINIROLE (REQUIP) 1 MG TABLET    TAKE 1 TABLET BY MOUTH EVERY NIGHT AT BEDTIME       ALLERGIES     has No Known Allergies. Diagnostic Results     EKG: All EKG's are interpreted by the Emergency Department Physician who either signs or Co-signs this chart in the [address] a cardiologist.        [place]:   Results for orders placed or performed during the hospital encounter of 10/26/21   Flu A/B Ag Detection    Specimen: Nasopharyngeal Swab   Result Value Ref Range    Specimen Description . NASOPHARYNGEAL SWAB     Special Requests NOT REPORTED     Direct Exam       NEGATIVE FOR INFLUENZA A AND B ANTIGEN. * A negative result does not exclude Influenza infection. Negative results should be confirmed by PCR testing. COVID-19, Rapid    Specimen: Nasopharyngeal Swab   Result Value Ref Range    Specimen Description . NASOPHARYNGEAL SWAB     SARS-CoV-2, Rapid Not Detected Not Detected   Comprehensive Metabolic Panel   Result Value Ref Range    Glucose 97 70 - 99 mg/dL    BUN 16 6 - 20 mg/dL    CREATININE 0.83 0.50 - 0.90 mg/dL    Bun/Cre Ratio 19 9 - 20    Calcium 9.0 8.6 - 10.4 mg/dL    Sodium 133 (L) 135 - 144 mmol/L    Potassium 2.9 (LL) 3.7 - 5.3 mmol/L    Chloride 94 (L) 98 - 107 mmol/L    CO2 26 20 - 31 mmol/L    Anion Gap 13 9 - 17 mmol/L    Alkaline Phosphatase 110 (H) 35 - 104 U/L    ALT 23 5 - 33 U/L    AST 17 <32 U/L    Total Bilirubin 0.19 (L) 0.3 - 1.2 mg/dL    Total Protein 7.6 6.4 - 8.3 g/dL    Albumin 4.1 3.5 - 5.2 g/dL    Albumin/Globulin Ratio 1.2 1.0 - 2.5    GFR Non-African American >60 >60 mL/min    GFR African American >60 >60 mL/min    GFR Comment          GFR Staging NOT REPORTED    CBC Auto Differential   Result Value Ref Range    WBC 6.4 3.5 - 11.3 k/uL    RBC 4.55 3.95 - 5.11 m/uL    Hemoglobin 13.0 11.9 - 15.1 g/dL Hematocrit 40.3 36.3 - 47.1 %    MCV 88.6 82.6 - 102.9 fL    MCH 28.6 25.2 - 33.5 pg    MCHC 32.3 25.2 - 33.5 g/dL    RDW 13.9 11.8 - 14.4 %    Platelets 029 707 - 832 k/uL    MPV 8.4 8.1 - 13.5 fL    NRBC Automated 0.0 0.0 per 100 WBC    Differential Type NOT REPORTED     Seg Neutrophils 65 36 - 65 %    Lymphocytes 24 24 - 43 %    Monocytes 7 3 - 12 %    Eosinophils % 2 1 - 4 %    Basophils 1 0 - 2 %    Immature Granulocytes 1 (H) 0 %    Segs Absolute 4.23 1.50 - 8.10 k/uL    Absolute Lymph # 1.54 1.10 - 3.70 k/uL    Absolute Mono # 0.45 0.10 - 1.20 k/uL    Absolute Eos # 0.11 0.00 - 0.44 k/uL    Basophils Absolute 0.03 0.00 - 0.20 k/uL    Absolute Immature Granulocyte 0.04 0.00 - 0.30 k/uL    WBC Morphology NOT REPORTED     RBC Morphology NOT REPORTED     Platelet Estimate NOT REPORTED    Lactic Acid   Result Value Ref Range    Lactic Acid 1.4 0.5 - 2.2 mmol/L   D-Dimer, Quantitative   Result Value Ref Range    D-Dimer, Quant 0.58 0.00 - 0.59 mg/L FEU   Troponin   Result Value Ref Range    Troponin, High Sensitivity 11 0 - 14 ng/L    Troponin T NOT REPORTED <0.03 ng/mL    Troponin Interp NOT REPORTED    EKG 12 Lead   Result Value Ref Range    Ventricular Rate 104 BPM    Atrial Rate 104 BPM    P-R Interval 156 ms    QRS Duration 82 ms    Q-T Interval 408 ms    QTc Calculation (Bazett) 536 ms    P Axis 59 degrees    R Axis 27 degrees    T Axis 91 degrees       RADIOLOGY:  CT CHEST PULMONARY EMBOLISM W CONTRAST   Final Result   1. Distal airway inflammation and left greater than right lower lobe airspace   disease. Pneumonia and aspiration are in the differential.   2. No acute pulmonary embolism to the segmental arteries. 3. Other findings as described.              RECENT VITALS:  BP: 126/79, Temp: 99.5 °F (37.5 °C), Pulse: 102, Resp: 16     ED Course     The patient was given the following medications:  Orders Placed This Encounter   Medications    ipratropium-albuterol (DUONEB) nebulizer solution 1 ampule Order Specific Question:   Initiate RT Bronchodilator Protocol     Answer: Yes    ipratropium-albuterol (DUONEB) 0.5-2.5 (3) MG/3ML nebulizer solution     KG MARTINS: cabinet override    methylPREDNISolone sodium (SOLU-MEDROL) injection 125 mg    ibuprofen (ADVIL;MOTRIN) tablet 800 mg    iopamidol (ISOVUE-370) 76 % injection 100 mL    potassium bicarb-citric acid (EFFER-K) effervescent tablet 40 mEq    albuterol sulfate  (90 Base) MCG/ACT inhaler 2 puff     Order Specific Question:   Initiate RT Bronchodilator Protocol     Answer:   No    amoxicillin-clavulanate (AUGMENTIN) 875-125 MG per tablet 1 tablet     Order Specific Question:   Antimicrobial Indications     Answer:   Pneumonia (CAP)    amoxicillin-clavulanate (AUGMENTIN) 875-125 MG per tablet     Sig: Take 1 tablet by mouth 2 times daily for 10 days     Dispense:  20 tablet     Refill:  0       Medical Decision Making               EMERGENCY DEPARTMENT COURSE:   Vitals:    Vitals:    10/26/21 1724 10/26/21 1900 10/26/21 1957   BP: 128/80  126/79   Pulse: 113 104 102   Resp: 16 16 16   Temp: 99.5 °F (37.5 °C)     TempSrc: Tympanic     SpO2: 97% 94% 97%   Weight: 198 lb (89.8 kg)     Height: 5' 4\" (1.626 m)       -------------------------  BP: 126/79, Temp: 99.5 °F (37.5 °C), Pulse: 102, Resp: 16      RE-EVALUATION:  CT per radiology showed no PE but possible pneumonia patient will start antibiotic care along with a prescription she will be given an inhaler spacer and teaching follow-up as directed return if worse    CONSULTS:      PROCEDURES:  None    FINAL IMPRESSION      1.  Pneumonia due to infectious organism, unspecified laterality, unspecified part of lung          DISPOSITION/PLAN   DISPOSITION Decision To Discharge 10/26/2021 08:21:31 PM      CONDITION ON DISPOSITION:   Stable    PATIENT REFERRED TO:  TAMARA Vaca - HAJA  [facility]  [phone]    In 2 days        DISCHARGE MEDICATIONS:  New Prescriptions    AMOXICILLIN-CLAVULANATE (AUGMENTIN) 875-125 MG PER TABLET    Take 1 tablet by mouth 2 times daily for 10 days       (Please note that portions of this note were completed with a voicerecognition program.  Efforts were made to edit the dictations but occasionally words are mis-transcribed.)    Tati Mishra MD  Attending Emergency Medicine Physician                   Tati Mishra MD  10/26/21 2024

## 2022-06-08 ENCOUNTER — OFFICE VISIT (OUTPATIENT)
Dept: FAMILY MEDICINE CLINIC | Age: 47
End: 2022-06-08
Payer: COMMERCIAL

## 2022-06-08 VITALS
SYSTOLIC BLOOD PRESSURE: 150 MMHG | HEART RATE: 102 BPM | BODY MASS INDEX: 33.3 KG/M2 | OXYGEN SATURATION: 99 % | WEIGHT: 194 LBS | RESPIRATION RATE: 16 BRPM | DIASTOLIC BLOOD PRESSURE: 100 MMHG

## 2022-06-08 DIAGNOSIS — B37.2 CANDIDIASIS OF SKIN: ICD-10-CM

## 2022-06-08 DIAGNOSIS — I10 PRIMARY HYPERTENSION: Primary | ICD-10-CM

## 2022-06-08 DIAGNOSIS — G57.93 NEUROPATHIC PAIN OF BOTH LEGS: ICD-10-CM

## 2022-06-08 DIAGNOSIS — G25.81 RESTLESS LEG SYNDROME: ICD-10-CM

## 2022-06-08 PROCEDURE — 99214 OFFICE O/P EST MOD 30 MIN: CPT | Performed by: FAMILY MEDICINE

## 2022-06-08 PROCEDURE — 99213 OFFICE O/P EST LOW 20 MIN: CPT | Performed by: FAMILY MEDICINE

## 2022-06-08 PROCEDURE — G8419 CALC BMI OUT NRM PARAM NOF/U: HCPCS | Performed by: FAMILY MEDICINE

## 2022-06-08 PROCEDURE — G8427 DOCREV CUR MEDS BY ELIG CLIN: HCPCS | Performed by: FAMILY MEDICINE

## 2022-06-08 PROCEDURE — 4004F PT TOBACCO SCREEN RCVD TLK: CPT | Performed by: FAMILY MEDICINE

## 2022-06-08 RX ORDER — GABAPENTIN 300 MG/1
CAPSULE ORAL
Qty: 30 CAPSULE | Refills: 5 | Status: SHIPPED | OUTPATIENT
Start: 2022-06-08 | End: 2022-07-07

## 2022-06-08 RX ORDER — AMLODIPINE BESYLATE 2.5 MG/1
2.5 TABLET ORAL DAILY
COMMUNITY
End: 2022-08-15 | Stop reason: SDUPTHER

## 2022-06-08 RX ORDER — KETOCONAZOLE 20 MG/G
CREAM TOPICAL
Qty: 30 G | Refills: 0 | Status: SHIPPED | OUTPATIENT
Start: 2022-06-08 | End: 2022-08-15 | Stop reason: ALTCHOICE

## 2022-06-08 RX ORDER — PRAZOSIN HYDROCHLORIDE 1 MG/1
1 CAPSULE ORAL 2 TIMES DAILY
Qty: 60 CAPSULE | Refills: 5 | Status: SHIPPED | OUTPATIENT
Start: 2022-06-08 | End: 2022-08-07

## 2022-06-08 SDOH — ECONOMIC STABILITY: FOOD INSECURITY: WITHIN THE PAST 12 MONTHS, YOU WORRIED THAT YOUR FOOD WOULD RUN OUT BEFORE YOU GOT MONEY TO BUY MORE.: NEVER TRUE

## 2022-06-08 SDOH — ECONOMIC STABILITY: FOOD INSECURITY: WITHIN THE PAST 12 MONTHS, THE FOOD YOU BOUGHT JUST DIDN'T LAST AND YOU DIDN'T HAVE MONEY TO GET MORE.: NEVER TRUE

## 2022-06-08 ASSESSMENT — PATIENT HEALTH QUESTIONNAIRE - PHQ9
8. MOVING OR SPEAKING SO SLOWLY THAT OTHER PEOPLE COULD HAVE NOTICED. OR THE OPPOSITE, BEING SO FIGETY OR RESTLESS THAT YOU HAVE BEEN MOVING AROUND A LOT MORE THAN USUAL: 0
7. TROUBLE CONCENTRATING ON THINGS, SUCH AS READING THE NEWSPAPER OR WATCHING TELEVISION: 0
SUM OF ALL RESPONSES TO PHQ QUESTIONS 1-9: 0
SUM OF ALL RESPONSES TO PHQ QUESTIONS 1-9: 0
9. THOUGHTS THAT YOU WOULD BE BETTER OFF DEAD, OR OF HURTING YOURSELF: 0
5. POOR APPETITE OR OVEREATING: 0
10. IF YOU CHECKED OFF ANY PROBLEMS, HOW DIFFICULT HAVE THESE PROBLEMS MADE IT FOR YOU TO DO YOUR WORK, TAKE CARE OF THINGS AT HOME, OR GET ALONG WITH OTHER PEOPLE: 0
2. FEELING DOWN, DEPRESSED OR HOPELESS: 0
1. LITTLE INTEREST OR PLEASURE IN DOING THINGS: 0
4. FEELING TIRED OR HAVING LITTLE ENERGY: 0
6. FEELING BAD ABOUT YOURSELF - OR THAT YOU ARE A FAILURE OR HAVE LET YOURSELF OR YOUR FAMILY DOWN: 0
SUM OF ALL RESPONSES TO PHQ QUESTIONS 1-9: 0
SUM OF ALL RESPONSES TO PHQ QUESTIONS 1-9: 0
3. TROUBLE FALLING OR STAYING ASLEEP: 0
SUM OF ALL RESPONSES TO PHQ9 QUESTIONS 1 & 2: 0

## 2022-06-08 ASSESSMENT — ENCOUNTER SYMPTOMS
SINUS PRESSURE: 0
DIARRHEA: 0
WHEEZING: 0
NAUSEA: 0
CHEST TIGHTNESS: 0
VOMITING: 0
EYE DISCHARGE: 0
ANAL BLEEDING: 0
EYE ITCHING: 1
SINUS PAIN: 0
EYE REDNESS: 0
SHORTNESS OF BREATH: 0
ABDOMINAL PAIN: 0

## 2022-06-08 ASSESSMENT — SOCIAL DETERMINANTS OF HEALTH (SDOH): HOW HARD IS IT FOR YOU TO PAY FOR THE VERY BASICS LIKE FOOD, HOUSING, MEDICAL CARE, AND HEATING?: NOT HARD AT ALL

## 2022-06-08 NOTE — PROGRESS NOTES
Fantasma Montes (:  1975) is a 55 y.o. female,Established patient, here for evaluation of the following chief complaint(s):  Established New Doctor (Eyes are red itch and burn. [facility]eds med[facility] refilled [facility] )         ASSESSMENT/PLAN:  1. Primary hypertension  Comments:  Increase prazosin 1 mg po bid. Recheck 3 weeks. 2. Neuropathic pain of both legs  Comments:  Continue present meds. 3. Restless leg syndrome  Comments:  Continue present meds. 4. Candidiasis of skin  Comments:  Ketoconazole cream around eyes qday x 2-3 weeks. Avoid in eyes. Return in about 3 weeks (around 2022) for HTN. Subjective   SUBJECTIVE/OBJECTIVE:  Has had HTN since . Runs high despite meds. No CP, dizziness, SOB, or syncope. Does have H/A's and edema. Smoker; plans to quit soon with hypnosis. RLS x 3 years. Medicines help a lot. Eyes irritated x 1 month. Seen in ER and advised Aquaphor OTC which helped but Sx's returned. Areas above and below eyes red and moist with itching. Review of Systems   Constitutional: Negative for appetite change and fever. HENT: Negative for sinus pressure and sinus pain. Eyes: Positive for itching. Negative for discharge, redness and visual disturbance. Respiratory: Negative for chest tightness, shortness of breath and wheezing. Cardiovascular: Positive for leg swelling. Negative for chest pain and palpitations. Gastrointestinal: Negative for abdominal pain, anal bleeding, diarrhea, nausea and vomiting. Genitourinary: Positive for menstrual problem. Negative for dysuria and hematuria. Hot flashes   Skin: Positive for rash. Neurological: Positive for headaches. Negative for dizziness, syncope and light-headedness. Hematological: Negative for adenopathy. Bruises/bleeds easily. Objective   Physical Exam  Constitutional:       General: She is not in acute distress. HENT:      Head: Normocephalic.    Eyes: General:         Right eye: No discharge. Left eye: No discharge. Extraocular Movements: Extraocular movements intact. Conjunctiva/sclera:      Right eye: Right conjunctiva is not injected. No exudate. Left eye: Left conjunctiva is not injected. No exudate. Pupils: Pupils are equal, round, and reactive to light. Comments: Upper and lower eyelids red with mild swelling   Cardiovascular:      Rate and Rhythm: Normal rate and regular rhythm. Heart sounds: No murmur heard. Pulmonary:      Effort: Pulmonary effort is normal.      Breath sounds: Normal breath sounds. Musculoskeletal:         General: No swelling. Cervical back: Neck supple. Right lower leg: No edema. Left lower leg: No edema. Neurological:      General: No focal deficit present. Mental Status: She is alert and oriented to person, place, and time. Psychiatric:         Mood and Affect: Mood normal.         Behavior: Behavior normal.                An electronic signature was used to authenticate this note.     --Elaine Machuca MD

## 2022-08-15 ENCOUNTER — OFFICE VISIT (OUTPATIENT)
Dept: FAMILY MEDICINE CLINIC | Age: 47
End: 2022-08-15
Payer: COMMERCIAL

## 2022-08-15 VITALS
OXYGEN SATURATION: 99 % | HEART RATE: 97 BPM | WEIGHT: 198 LBS | BODY MASS INDEX: 33.99 KG/M2 | DIASTOLIC BLOOD PRESSURE: 88 MMHG | SYSTOLIC BLOOD PRESSURE: 132 MMHG | RESPIRATION RATE: 16 BRPM

## 2022-08-15 DIAGNOSIS — E78.2 MIXED HYPERLIPIDEMIA: ICD-10-CM

## 2022-08-15 DIAGNOSIS — J30.1 SEASONAL ALLERGIC RHINITIS DUE TO POLLEN: ICD-10-CM

## 2022-08-15 DIAGNOSIS — I10 PRIMARY HYPERTENSION: Primary | ICD-10-CM

## 2022-08-15 PROBLEM — H65.02 ACUTE SEROUS OTITIS MEDIA OF LEFT EAR: Status: ACTIVE | Noted: 2022-08-15

## 2022-08-15 PROCEDURE — 99212 OFFICE O/P EST SF 10 MIN: CPT | Performed by: FAMILY MEDICINE

## 2022-08-15 PROCEDURE — 4004F PT TOBACCO SCREEN RCVD TLK: CPT | Performed by: FAMILY MEDICINE

## 2022-08-15 PROCEDURE — G8417 CALC BMI ABV UP PARAM F/U: HCPCS | Performed by: FAMILY MEDICINE

## 2022-08-15 PROCEDURE — G8427 DOCREV CUR MEDS BY ELIG CLIN: HCPCS | Performed by: FAMILY MEDICINE

## 2022-08-15 PROCEDURE — 99213 OFFICE O/P EST LOW 20 MIN: CPT | Performed by: FAMILY MEDICINE

## 2022-08-15 RX ORDER — LISINOPRIL 10 MG/1
TABLET ORAL
Qty: 45 TABLET | Refills: 3 | Status: SHIPPED | OUTPATIENT
Start: 2022-08-15

## 2022-08-15 RX ORDER — HYDROCHLOROTHIAZIDE 25 MG/1
25 TABLET ORAL EVERY MORNING
Qty: 30 TABLET | Refills: 3 | Status: SHIPPED | OUTPATIENT
Start: 2022-08-15

## 2022-08-15 RX ORDER — ATORVASTATIN CALCIUM 10 MG/1
10 TABLET, FILM COATED ORAL DAILY
Qty: 30 TABLET | Refills: 3 | Status: SHIPPED | OUTPATIENT
Start: 2022-08-15

## 2022-08-15 RX ORDER — FLUTICASONE PROPIONATE 50 MCG
1 SPRAY, SUSPENSION (ML) NASAL DAILY
Qty: 1 EACH | Refills: 3 | Status: SHIPPED | OUTPATIENT
Start: 2022-08-15

## 2022-08-15 RX ORDER — HYDROCODONE BITARTRATE AND ACETAMINOPHEN 5; 325 MG/1; MG/1
TABLET ORAL
COMMUNITY
Start: 2022-08-02 | End: 2022-08-15 | Stop reason: ALTCHOICE

## 2022-08-15 RX ORDER — AMLODIPINE BESYLATE 2.5 MG/1
2.5 TABLET ORAL DAILY
Qty: 30 TABLET | Refills: 3 | Status: SHIPPED | OUTPATIENT
Start: 2022-08-15

## 2022-08-15 ASSESSMENT — ENCOUNTER SYMPTOMS
ABDOMINAL PAIN: 0
ANAL BLEEDING: 0
SHORTNESS OF BREATH: 0
EYE DISCHARGE: 0
VOMITING: 0
DIARRHEA: 0
NAUSEA: 0
EYE REDNESS: 0
EYE ITCHING: 1
WHEEZING: 0
CHEST TIGHTNESS: 0

## 2022-08-15 NOTE — PROGRESS NOTES
Pippa Villa (:  1975) is a 55 y.o. female,Established patient, here for evaluation of the following chief complaint(s):  Follow-up (Eyes still red and itchy and feels scaly. )         ASSESSMENT/PLAN:  1. Primary hypertension  Comments:  Continue meds. Orders:  -     lisinopril (PRINIVIL;ZESTRIL) 10 MG tablet; TAKE 1 AND 1/2 TABLET BY MOUTH ONE TIME DAILY, Disp-45 tablet, R-3Normal  -     amLODIPine (NORVASC) 2.5 MG tablet; Take 1 tablet by mouth in the morning., Disp-30 tablet, R-3Normal  -     hydroCHLOROthiazide (HYDRODIURIL) 25 MG tablet; Take 1 tablet by mouth every morning, Disp-30 tablet, R-3Normal  2. Mixed hyperlipidemia  Comments:  Resume atorvastatin and check labs in 3-4 weeks. Orders:  -     atorvastatin (LIPITOR) 10 MG tablet; Take 1 tablet by mouth in the morning., Disp-30 tablet, R-3Normal  -     Comprehensive Metabolic Panel; Future  -     Lipid Panel; Future  3. Seasonal allergic rhinitis due to pollen  Comments:  Continue Flonase. May use Cortaid OTC foraround eyes; do not get in eyes! Orders:  -     fluticasone (FLONASE) 50 MCG/ACT nasal spray; 1 spray by Nasal route in the morning., Disp-1 each, R-3Normal      Return in about 4 months (around 12/15/2022) for HTN, lipids. Subjective   SUBJECTIVE/OBJECTIVE:  BP well controlled with present medical regiment. No CP, SOB, H/A's, dizziness, or edema. No SE's. Feels good. No taking lipid med; not sure why stopped but believes RF's missed. Willing to resume. Last lipid level / CMP normal 2022. Flonase helps nasal congestion; uses qday. Ketoconazole did not help around eyes. Discussed may be from allergies and will try Cortaid OTC; avoid eyes with med. Just had sutures out today from [facility]. No problems from surgery. Review of Systems   Constitutional:  Negative for appetite change and unexpected weight change. Eyes:  Positive for itching. Negative for discharge, redness and visual disturbance.    Respiratory: Negative for chest tightness, shortness of breath and wheezing. Cardiovascular:  Negative for chest pain, palpitations and leg swelling. Gastrointestinal:  Negative for abdominal pain, anal bleeding, diarrhea, nausea and vomiting. Genitourinary:  Negative for dysuria and hematuria. Hot flashes   Skin:  Positive for rash. Neurological:  Negative for dizziness, syncope, light-headedness and headaches. Hematological:  Negative for adenopathy. Bruises/bleeds easily. Objective   Physical Exam  Constitutional:       General: She is not in acute distress. HENT:      Head: Normocephalic. Eyes:      General:         Right eye: No discharge. Left eye: No discharge. Extraocular Movements: Extraocular movements intact. Conjunctiva/sclera:      Right eye: Right conjunctiva is not injected. No exudate. Left eye: Left conjunctiva is not injected. No exudate. Pupils: Pupils are equal, round, and reactive to light. Comments: Upper and lower eyelids mildlyred with minimal swelling. Cardiovascular:      Rate and Rhythm: Normal rate and regular rhythm. Heart sounds: No murmur heard. Pulmonary:      Effort: Pulmonary effort is normal.      Breath sounds: Normal breath sounds. Musculoskeletal:         General: No swelling. Cervical back: Neck supple. Right lower leg: No edema. Left lower leg: No edema. Neurological:      General: No focal deficit present. Mental Status: She is alert and oriented to person, place, and time. Psychiatric:         Mood and Affect: Mood normal.         Behavior: Behavior normal.            An electronic signature was used to authenticate this note.     --Chi Dias MD

## 2022-11-03 DIAGNOSIS — F32.A DEPRESSION, UNSPECIFIED DEPRESSION TYPE: ICD-10-CM

## 2022-11-04 DIAGNOSIS — F32.A DEPRESSION, UNSPECIFIED DEPRESSION TYPE: ICD-10-CM

## 2022-11-04 RX ORDER — BUPROPION HYDROCHLORIDE 300 MG/1
TABLET ORAL
Qty: 30 TABLET | Refills: 0 | Status: SHIPPED | OUTPATIENT
Start: 2022-11-04

## 2022-11-04 RX ORDER — BUPROPION HYDROCHLORIDE 300 MG/1
TABLET ORAL
Qty: 90 TABLET | OUTPATIENT
Start: 2022-11-04

## 2022-11-04 NOTE — TELEPHONE ENCOUNTER
Chai Barfield is requesting a refill on the following medication(s):  Requested Prescriptions     Pending Prescriptions Disp Refills    buPROPion (WELLBUTRIN XL) 300 MG extended release tablet 30 tablet 1       Last Visit Date (If Applicable):  Visit date not found    Next Visit Date:    Visit date not found

## 2022-11-07 LAB
ALBUMIN/GLOBULIN RATIO: 1.3 G/DL
ALBUMIN: 4 G/DL (ref 3.5–5)
ALP BLD-CCNC: 101 UNITS/L (ref 38–126)
ALT SERPL-CCNC: 29 UNITS/L (ref 4–35)
ANION GAP SERPL CALCULATED.3IONS-SCNC: 10.6 MMOL/L
AST SERPL-CCNC: 22 UNITS/L (ref 14–36)
BILIRUB SERPL-MCNC: 0.3 MG/DL (ref 0.2–1.3)
BUN BLDV-MCNC: 18 MG/DL (ref 7–17)
CALCIUM SERPL-MCNC: 8.8 MG/DL (ref 8.4–10.2)
CHLORIDE BLD-SCNC: 101 MMOL/L (ref 98–120)
CHOLESTEROL/HDL RATIO: 3.26 RATIO (ref 0–4.5)
CHOLESTEROL: 163 MG/DL (ref 50–200)
CO2: 29 MMOL/L (ref 22–31)
CREAT SERPL-MCNC: 0.8 MG/DL (ref 0.5–1)
GFR CALCULATED: > 60
GLOBULIN: 3.1 G/DL
GLUCOSE: 100 MG/DL (ref 65–105)
HDLC SERPL-MCNC: 50 MG/DL (ref 36–68)
LDL CHOLESTEROL CALCULATED: 86.8 MG/DL (ref 0–160)
POTASSIUM SERPL-SCNC: 3.8 MMOL/L (ref 3.6–5)
SODIUM BLD-SCNC: 141 MMOL/L (ref 135–145)
TOTAL PROTEIN, SERUM: 7.1 G/DL (ref 6.3–8.2)
TRIGL SERPL-MCNC: 131 MG/DL (ref 10–250)
VLDLC SERPL CALC-MCNC: 26 MG/DL (ref 0–50)

## 2022-12-04 DIAGNOSIS — F32.A DEPRESSION, UNSPECIFIED DEPRESSION TYPE: ICD-10-CM

## 2022-12-05 NOTE — TELEPHONE ENCOUNTER
Anabel Pineda is requesting a refill on the following medication(s):  Requested Prescriptions     Pending Prescriptions Disp Refills    buPROPion (WELLBUTRIN XL) 300 MG extended release tablet [Pharmacy Med Name: BUPROPION HCL  MG TABLET] 30 tablet 0     Sig: take 1 tablet by mouth every morning       Last Visit Date (If Applicable):  Visit date not found    Next Visit Date:    Visit date not found

## 2022-12-13 NOTE — TELEPHONE ENCOUNTER
Attempted to call patient and the phone number is out od service, called contact number it is also not correct according to the person called.

## 2022-12-20 ENCOUNTER — TELEPHONE (OUTPATIENT)
Dept: FAMILY MEDICINE CLINIC | Age: 47
End: 2022-12-20

## 2022-12-20 NOTE — TELEPHONE ENCOUNTER
Pt came into the office looking for this refill. Per alphabet it is to be filled by amanda, pt is also scheduled for a new pt appt on 1/18 with amanda.

## 2022-12-21 RX ORDER — BUPROPION HYDROCHLORIDE 300 MG/1
TABLET ORAL
Qty: 30 TABLET | Refills: 2 | Status: SHIPPED | OUTPATIENT
Start: 2022-12-21

## 2023-01-18 DIAGNOSIS — E78.2 MIXED HYPERLIPIDEMIA: ICD-10-CM

## 2023-01-18 DIAGNOSIS — I10 PRIMARY HYPERTENSION: ICD-10-CM

## 2023-01-18 DIAGNOSIS — J30.1 SEASONAL ALLERGIC RHINITIS DUE TO POLLEN: ICD-10-CM

## 2023-01-18 NOTE — TELEPHONE ENCOUNTER
Nicho Sellers is calling to request a refill on the following medication(s):  Requested Prescriptions     Pending Prescriptions Disp Refills    hydroCHLOROthiazide (HYDRODIURIL) 25 MG tablet [Pharmacy Med Name: HYDROCHLOROTHIAZIDE 25 MG TAB] 30 tablet 0     Sig: take 1 tablet by mouth every morning    fluticasone (FLONASE) 50 MCG/ACT nasal spray [Pharmacy Med Name: FLUTICASONE PROP 50 MCG SPRAY] 5 g 0     Sig: instill 1 spray into each nostril every morning    amLODIPine (NORVASC) 2.5 MG tablet [Pharmacy Med Name: AMLODIPINE BESYLATE 2.5 MG TAB] 30 tablet 0     Sig: take 1 tablet by mouth every morning    lisinopril (PRINIVIL;ZESTRIL) 10 MG tablet [Pharmacy Med Name: LISINOPRIL 10 MG TABLET] 45 tablet 0     Sig: take 1 and 1/2 tablets by mouth once daily    atorvastatin (LIPITOR) 10 MG tablet [Pharmacy Med Name: ATORVASTATIN 10 MG TABLET] 30 tablet 0     Sig: take 1 tablet by mouth once daily       Last Visit Date (If Applicable):  2/58/4769    Next Visit Date:    2/14/2023

## 2023-01-20 RX ORDER — ATORVASTATIN CALCIUM 10 MG/1
TABLET, FILM COATED ORAL
Qty: 30 TABLET | Refills: 0 | Status: SHIPPED | OUTPATIENT
Start: 2023-01-20

## 2023-01-20 RX ORDER — AMLODIPINE BESYLATE 2.5 MG/1
TABLET ORAL
Qty: 30 TABLET | Refills: 0 | Status: SHIPPED | OUTPATIENT
Start: 2023-01-20

## 2023-01-20 RX ORDER — FLUTICASONE PROPIONATE 50 MCG
SPRAY, SUSPENSION (ML) NASAL
Qty: 5 G | Refills: 0 | Status: SHIPPED | OUTPATIENT
Start: 2023-01-20

## 2023-01-20 RX ORDER — LISINOPRIL 10 MG/1
TABLET ORAL
Qty: 45 TABLET | Refills: 0 | Status: SHIPPED | OUTPATIENT
Start: 2023-01-20

## 2023-01-20 RX ORDER — HYDROCHLOROTHIAZIDE 25 MG/1
25 TABLET ORAL EVERY MORNING
Qty: 30 TABLET | Refills: 0 | Status: SHIPPED | OUTPATIENT
Start: 2023-01-20

## 2023-02-13 DIAGNOSIS — F32.A DEPRESSION, UNSPECIFIED DEPRESSION TYPE: ICD-10-CM

## 2023-02-13 RX ORDER — BUPROPION HYDROCHLORIDE 300 MG/1
TABLET ORAL
Qty: 30 TABLET | Refills: 2 | Status: CANCELLED | OUTPATIENT
Start: 2023-02-13

## 2023-02-13 SDOH — HEALTH STABILITY: PHYSICAL HEALTH: ON AVERAGE, HOW MANY MINUTES DO YOU ENGAGE IN EXERCISE AT THIS LEVEL?: 20 MIN

## 2023-02-13 SDOH — HEALTH STABILITY: PHYSICAL HEALTH: ON AVERAGE, HOW MANY DAYS PER WEEK DO YOU ENGAGE IN MODERATE TO STRENUOUS EXERCISE (LIKE A BRISK WALK)?: 4 DAYS

## 2023-02-13 ASSESSMENT — SOCIAL DETERMINANTS OF HEALTH (SDOH)

## 2023-02-13 NOTE — TELEPHONE ENCOUNTER
Medication last refilled 12/21/22 with 3 refills. Patient should have enough medication to get her through to her appointment tomorrow. Needs to contact pharmacy.

## 2023-02-13 NOTE — TELEPHONE ENCOUNTER
Pt came in office, thought her appt was today, wants a refill on her wellbutrin since shes been out for a while, wants to know what she should do for her medication, told pt I would put in a note to see if theres anything we can do till tomorrow.

## 2023-02-14 ENCOUNTER — OFFICE VISIT (OUTPATIENT)
Dept: FAMILY MEDICINE CLINIC | Age: 48
End: 2023-02-14

## 2023-02-14 VITALS
OXYGEN SATURATION: 97 % | WEIGHT: 208.8 LBS | SYSTOLIC BLOOD PRESSURE: 148 MMHG | HEART RATE: 97 BPM | DIASTOLIC BLOOD PRESSURE: 92 MMHG | BODY MASS INDEX: 35.84 KG/M2

## 2023-02-14 DIAGNOSIS — F41.8 MIXED ANXIETY AND DEPRESSIVE DISORDER: ICD-10-CM

## 2023-02-14 DIAGNOSIS — E78.2 MIXED HYPERLIPIDEMIA: ICD-10-CM

## 2023-02-14 DIAGNOSIS — Z12.31 ENCOUNTER FOR SCREENING MAMMOGRAM FOR BREAST CANCER: ICD-10-CM

## 2023-02-14 DIAGNOSIS — I10 PRIMARY HYPERTENSION: Primary | ICD-10-CM

## 2023-02-14 DIAGNOSIS — G25.81 RESTLESS LEG SYNDROME: ICD-10-CM

## 2023-02-14 DIAGNOSIS — R53.82 CHRONIC FATIGUE: ICD-10-CM

## 2023-02-14 DIAGNOSIS — K21.9 GASTROESOPHAGEAL REFLUX DISEASE, UNSPECIFIED WHETHER ESOPHAGITIS PRESENT: ICD-10-CM

## 2023-02-14 DIAGNOSIS — Z76.89 ENCOUNTER TO ESTABLISH CARE: ICD-10-CM

## 2023-02-14 RX ORDER — BUPROPION HYDROCHLORIDE 300 MG/1
TABLET ORAL
Qty: 30 TABLET | Refills: 5 | Status: SHIPPED | OUTPATIENT
Start: 2023-02-14

## 2023-02-14 RX ORDER — AMLODIPINE BESYLATE 2.5 MG/1
2.5 TABLET ORAL DAILY
Qty: 30 TABLET | Refills: 5 | Status: SHIPPED | OUTPATIENT
Start: 2023-02-14

## 2023-02-14 RX ORDER — ATORVASTATIN CALCIUM 10 MG/1
10 TABLET, FILM COATED ORAL DAILY
Qty: 30 TABLET | Refills: 5 | Status: SHIPPED | OUTPATIENT
Start: 2023-02-14

## 2023-02-14 RX ORDER — PANTOPRAZOLE SODIUM 40 MG/1
40 TABLET, DELAYED RELEASE ORAL
Qty: 60 TABLET | Refills: 3 | Status: SHIPPED | OUTPATIENT
Start: 2023-02-14

## 2023-02-14 RX ORDER — ROPINIROLE 1 MG/1
1 TABLET, FILM COATED ORAL NIGHTLY
Qty: 30 TABLET | Refills: 5 | Status: SHIPPED | OUTPATIENT
Start: 2023-02-14

## 2023-02-14 RX ORDER — LISINOPRIL 20 MG/1
TABLET ORAL
Qty: 30 TABLET | Refills: 5 | Status: SHIPPED | OUTPATIENT
Start: 2023-02-14

## 2023-02-14 RX ORDER — CITALOPRAM 10 MG/1
10 TABLET ORAL DAILY
Qty: 30 TABLET | Refills: 3 | Status: SHIPPED | OUTPATIENT
Start: 2023-02-14

## 2023-02-14 RX ORDER — HYDROCHLOROTHIAZIDE 25 MG/1
25 TABLET ORAL EVERY MORNING
Qty: 30 TABLET | Refills: 3 | Status: SHIPPED | OUTPATIENT
Start: 2023-02-14

## 2023-02-14 RX ORDER — BUSPIRONE HYDROCHLORIDE 5 MG/1
5 TABLET ORAL 2 TIMES DAILY
Qty: 60 TABLET | Refills: 5 | Status: SHIPPED | OUTPATIENT
Start: 2023-02-14

## 2023-02-14 ASSESSMENT — PATIENT HEALTH QUESTIONNAIRE - PHQ9
6. FEELING BAD ABOUT YOURSELF - OR THAT YOU ARE A FAILURE OR HAVE LET YOURSELF OR YOUR FAMILY DOWN: 2
3. TROUBLE FALLING OR STAYING ASLEEP: 3
2. FEELING DOWN, DEPRESSED OR HOPELESS: 2
SUM OF ALL RESPONSES TO PHQ QUESTIONS 1-9: 18
10. IF YOU CHECKED OFF ANY PROBLEMS, HOW DIFFICULT HAVE THESE PROBLEMS MADE IT FOR YOU TO DO YOUR WORK, TAKE CARE OF THINGS AT HOME, OR GET ALONG WITH OTHER PEOPLE: 1
5. POOR APPETITE OR OVEREATING: 3
7. TROUBLE CONCENTRATING ON THINGS, SUCH AS READING THE NEWSPAPER OR WATCHING TELEVISION: 2
8. MOVING OR SPEAKING SO SLOWLY THAT OTHER PEOPLE COULD HAVE NOTICED. OR THE OPPOSITE, BEING SO FIGETY OR RESTLESS THAT YOU HAVE BEEN MOVING AROUND A LOT MORE THAN USUAL: 2
SUM OF ALL RESPONSES TO PHQ QUESTIONS 1-9: 18
1. LITTLE INTEREST OR PLEASURE IN DOING THINGS: 1
SUM OF ALL RESPONSES TO PHQ QUESTIONS 1-9: 18
SUM OF ALL RESPONSES TO PHQ QUESTIONS 1-9: 18
9. THOUGHTS THAT YOU WOULD BE BETTER OFF DEAD, OR OF HURTING YOURSELF: 0
4. FEELING TIRED OR HAVING LITTLE ENERGY: 3
SUM OF ALL RESPONSES TO PHQ9 QUESTIONS 1 & 2: 3

## 2023-02-14 ASSESSMENT — ENCOUNTER SYMPTOMS: SINUS PRESSURE: 1

## 2023-02-14 NOTE — PROGRESS NOTES
[address] [address] [address]  Dept: [phone]  Dept Fax: [phone]    Date of Service:  2023    Goldie Severance (:  1975) is a 52 y.o. female, here for evaluation of the following chief complaint(s):    Chief Complaint   Patient presents with    Established New Doctor     Former pcp dr Stacy Bowen    Hypertension     Monitors bp at home and is always elevated has family hx of htn on both sides of family. Usually runs 180/102    Depression     States has been on wellbutrin for five years same dose but does not feel as effective has days of depression, sleep issues        Assessment/Plan:   1. Primary hypertension  Assessment & Plan:   Unclear control, continue current medications. Instructed patient to monitor blood pressure at home and keep a log. Increase water and activity level, decrease salt in diet. Orders:  -     amLODIPine (NORVASC) 2.5 MG tablet; Take 1 tablet by mouth daily, Disp-30 tablet, R-5Normal  -     hydroCHLOROthiazide (HYDRODIURIL) 25 MG tablet; Take 1 tablet by mouth every morning, Disp-30 tablet, R-3Normal  -     lisinopril (PRINIVIL;ZESTRIL) 20 MG tablet; Take 1 tablet by mouth daily. , Disp-30 tablet, R-5Normal  -     Comprehensive Metabolic Panel; Future  -     CBC with Auto Differential; Future  -     Lipid, Fasting; Future  -     Microalbumin, Ur; Future  2. Mixed hyperlipidemia  -     atorvastatin (LIPITOR) 10 MG tablet; Take 1 tablet by mouth daily, Disp-30 tablet, R-5Normal  -     Comprehensive Metabolic Panel; Future  -     CBC with Auto Differential; Future  -     Lipid, Fasting; Future  3. Restless leg syndrome  -     rOPINIRole (REQUIP) 1 MG tablet; Take 1 tablet by mouth nightly, Disp-30 tablet, R-5Normal  -     Comprehensive Metabolic Panel; Future  -     CBC with Auto Differential; Future  -     Vitamin B12; Future  -     TSH With Reflex Ft4; Future  -     Ferritin;  Future  -     Iron and TIBC; Future  4. Gastroesophageal reflux disease, unspecified whether esophagitis present  Assessment & Plan:   Uncontrolled, lifestyle modifications recommended. Restart pantoprazole 40 mg daily, 30 minutes prior to breakfast.  Orders:  -     pantoprazole (PROTONIX) 40 MG tablet; Take 1 tablet by mouth 2 times daily (before meals), Disp-60 tablet, R-3Normal  -     Comprehensive Metabolic Panel; Future  -     CBC with Auto Differential; Future  -     TSH With Reflex Ft4; Future  5. Mixed anxiety and depressive disorder  Assessment & Plan:   Uncontrolled, continue current medications and medication adherence emphasized. Recommend keeping the same dose of Wellbutrin and starting citalopram 10 mg daily. I've explained to her that drugs of the SSRI class can have side effects such as weight gain, sexual dysfunction, insomnia, headache, nausea. These medications are generally effective at alleviating symptoms of anxiety and/or depression. Let me know if significant side effects do occur. Orders:  -     buPROPion (WELLBUTRIN XL) 300 MG extended release tablet; take 1 tablet by mouth every morning, Disp-30 tablet, R-5Normal  -     busPIRone (BUSPAR) 5 MG tablet; Take 1 tablet by mouth 2 times daily, Disp-60 tablet, R-5Normal  -     citalopram (CELEXA) 10 MG tablet; Take 1 tablet by mouth daily, Disp-30 tablet, R-3Normal  -     Comprehensive Metabolic Panel; Future  -     CBC with Auto Differential; Future  -     Vitamin D 25 Hydroxy; Future  -     Vitamin B12; Future  -     TSH With Reflex Ft4; Future  6. Chronic fatigue  -     Comprehensive Metabolic Panel; Future  -     CBC with Auto Differential; Future  -     Vitamin D 25 Hydroxy; Future  -     Vitamin B12; Future  -     TSH With Reflex Ft4; Future  7. Encounter to establish care  8. Encounter for screening mammogram for breast cancer  -     PEDRO DIGITAL SCREEN W OR WO CAD BILATERAL; Future       Encouraged healthy diet and routine exercise.  Instructed to continue current medications. All patient questions answered. Patient voiced understanding. Return in about 4 weeks (around 3/14/2023) for HTN, Depression, Anxiety. Subjective:   Presents to establish care. Monitors blood pressure at home and reports readings have been high. She does not watch the salt in her diet. She has not taken her medication today. Occasionally forgets to take her meds. She complains of having heartburn all the time and eating TUMS \"like candy\" to control symptoms. EGD and colonoscopy completed 11/2021. History abnormal stress test, completed heart cath 8/25/2020 resulted with normal coronary arteries. She would like to discuss options for depression and anxiety. She has been taking Wellbutrin for the past 5 years and feels it is not working much anymore. She is also taking buspirone splitting the pills in half due to feeling tired with the full 10 mg tablet. Hypertension  This is a chronic problem. The current episode started more than 1 year ago. The problem is unchanged. The problem is uncontrolled. Associated symptoms include anxiety and headaches. Pertinent negatives include no chest pain, orthopnea, palpitations, peripheral edema, PND or shortness of breath. There are no associated agents to hypertension. Risk factors for coronary artery disease include family history and obesity. Past treatments include diuretics, ACE inhibitors and calcium channel blockers. Compliance problems include exercise and diet. There is no history of kidney disease, CAD/MI or heart failure. Gynecologic History  No LMP recorded. Patient has had a hysterectomy. She does perform regular breast self exams.        The 10-year ASCVD risk score (Tina DUKE, et al., 2019) is: 4.5%    Values used to calculate the score:      Age: 52 years      Sex: Female      Is Non- : No      Diabetic: No      Tobacco smoker: Yes      Systolic Blood Pressure: 551 mmHg      Is BP treated: Yes      HDL Cholesterol: 50 mg/dL      Total Cholesterol: 163 mg/dL     BP Readings from Last 3 Encounters:   02/14/23 (!) 148/92   08/15/22 132/88   06/08/22 (!) 150/100      Pulse Readings from Last 3 Encounters:   02/14/23 97   08/15/22 97   06/08/22 (!) 102      Wt Readings from Last 3 Encounters:   02/14/23 208 lb 12.8 oz (94.7 kg)   08/15/22 198 lb (89.8 kg)   06/08/22 194 lb (88 kg)        Preventive Care:       Health Maintenance   Topic Date Due    COVID-19 Vaccine (3 - Booster for Pfizer series) 08/01/2022    Flu vaccine (1) 02/14/2024 (Originally 8/1/2022)    Lipids  11/07/2023    Depression Monitoring  02/14/2024    DTaP/Tdap/Td vaccine (2 - Td or Tdap) 04/23/2029    Colorectal Cancer Screen  11/22/2031    Pneumococcal 0-64 years Vaccine  Completed    HIV screen  Completed    Hepatitis A vaccine  Aged Out    Hib vaccine  Aged Out    Meningococcal (ACWY) vaccine  Aged Out    Hepatitis C screen  Discontinued        Current Outpatient Medications   Medication Sig Dispense Refill    amLODIPine (NORVASC) 2.5 MG tablet Take 1 tablet by mouth daily 30 tablet 5    atorvastatin (LIPITOR) 10 MG tablet Take 1 tablet by mouth daily 30 tablet 5    buPROPion (WELLBUTRIN XL) 300 MG extended release tablet take 1 tablet by mouth every morning 30 tablet 5    busPIRone (BUSPAR) 5 MG tablet Take 1 tablet by mouth 2 times daily 60 tablet 5    hydroCHLOROthiazide (HYDRODIURIL) 25 MG tablet Take 1 tablet by mouth every morning 30 tablet 3    rOPINIRole (REQUIP) 1 MG tablet Take 1 tablet by mouth nightly 30 tablet 5    lisinopril (PRINIVIL;ZESTRIL) 20 MG tablet Take 1 tablet by mouth daily.  30 tablet 5    pantoprazole (PROTONIX) 40 MG tablet Take 1 tablet by mouth 2 times daily (before meals) 60 tablet 3    citalopram (CELEXA) 10 MG tablet Take 1 tablet by mouth daily 30 tablet 3    fluticasone (FLONASE) 50 MCG/ACT nasal spray instill 1 spray into each nostril every morning 5 g 0    prazosin (MINIPRESS) 1 MG capsule Take 1 capsule by mouth 2 times daily 60 capsule 5    Blood Pressure KIT Use to check blood pressure daily. 1 kit 0    ibuprofen (ADVIL;MOTRIN) 800 MG tablet TAKE ONE TABLET BY MOUTH EVERY 8 HOURS AS NEEDED FOR PAIN 90 tablet 0    clindamycin (CLEOCIN) 2 % vaginal cream Place vaginally nightly for 7 nights 1 Tube 0    gabapentin (NEURONTIN) 300 MG capsule TAKE ONE CAPSULE BY MOUTH QHS for RLS 30 capsule 5     No current facility-administered medications for this visit.         Past Medical History:   Diagnosis Date    Anxiety     Chronic back pain     Depression     Hypertension     Neuropathy        Past Surgical History:   Procedure Laterality Date    CARDIAC CATHETERIZATION  2020    Dr. Katia Floyd, Normal coronary arteries    CARDIOVASCULAR STRESS TEST  2020    abnormal    CARPAL TUNNEL RELEASE Right 2022    Dr. Lesley Vanegas at [address] Right 2023    thumb Dr. Brenden Pena, TOTAL ABDOMINAL (CERVIX REMOVED)  2009    WISDOM TOOTH EXTRACTION         Social History     Tobacco Use    Smoking status: Every Day     Packs/day: 0.50     Years: 30.00     Pack years: 15.00     Types: Cigarettes    Smokeless tobacco: Never    Tobacco comments:     cut down to 4 cigarettes a day   Vaping Use    Vaping Use: Never used   Substance Use Topics    Alcohol use: Yes     Comment: socially    Drug use: Never        Family History   Problem Relation Age of Onset    Crohn's Disease Mother     Hypertension Mother     Heart Disease Father     Heart Surgery Father         CABG x 4 vessel    Hypertension Father     Heart Attack Sister         asystole, pacemaker, age 45 yrs    Hypertension Sister     Heart Attack Brother 44    Stroke Brother 44    Heart Failure Brother         age 36 yrs    Hypertension Brother     Heart Disease Maternal Grandmother          at age 58 yr    Heart Disease Paternal Grandmother          at age 61 yrs    Heart Disease Paternal Grandfather          at age 71 yrs Diabetes Neg Hx     Cataracts Neg Hx     Glaucoma Neg Hx        Review of Systems:     Review of Systems   Constitutional:  Negative for appetite change, chills, fatigue and fever. HENT:  Positive for congestion (in the am, clears in the afternoon) and sinus pressure. Eyes: Negative. Respiratory:  Negative for cough, shortness of breath and wheezing. Cardiovascular:  Negative for chest pain, palpitations, orthopnea, leg swelling and PND. Gastrointestinal:  Negative for abdominal pain, constipation, diarrhea and nausea. Endocrine: Negative for cold intolerance, heat intolerance, polydipsia, polyphagia and polyuria. Genitourinary: Negative. Neurological:  Positive for headaches. Negative for dizziness and light-headedness. Psychiatric/Behavioral:  Positive for dysphoric mood and sleep disturbance. Negative for agitation, self-injury and suicidal ideas. The patient is nervous/anxious. PHQ Scores 2/14/2023 6/8/2022 9/3/2020 7/13/2020 12/17/2019 4/23/2019   PHQ2 Score 3 0 2 6 4 4   PHQ9 Score 18 0 2 20 17 13     Interpretation of Total Score Depression Severity: 1-4 = Minimal depression, 5-9 = Mild depression, 10-14 = Moderate depression, 15-19 = Moderately severe depression, 20-27 = Severe depression     Physical Exam:     Vitals:    02/14/23 0800 02/14/23 0857   BP: (!) 144/94 (!) 148/92   Pulse: 97    SpO2: 97%    Weight: 208 lb 12.8 oz (94.7 kg)      Body mass index is 35.84 kg/m². Physical Exam  Constitutional:       Appearance: Normal appearance. She is well-developed and well-groomed. She is obese. HENT:      Head: Normocephalic. Eyes:      Conjunctiva/sclera: Conjunctivae normal.   Neck:      Thyroid: No thyromegaly. Vascular: No carotid bruit. Cardiovascular:      Rate and Rhythm: Normal rate and regular rhythm. Heart sounds: Normal heart sounds. Pulmonary:      Effort: Pulmonary effort is normal.      Breath sounds: Normal breath sounds. No wheezing. Musculoskeletal:      Cervical back: Neck supple. Right lower leg: No edema. Left lower leg: No edema. Lymphadenopathy:      Cervical: No cervical adenopathy. Skin:     Capillary Refill: Capillary refill takes less than 2 seconds. Neurological:      Mental Status: She is alert and oriented to person, place, and time. Gait: Gait normal.   Psychiatric:         Mood and Affect: Mood is anxious and depressed. Behavior: Behavior is cooperative. Please note that this chart was generated using voice recognition Dragon dictation software. Although every effort was made to ensure the accuracy of this automated transcription, some errors in transcription may have occurred.     Electronically signed by TAMARA Chung CNP on 2/19/2023 at 9:05 AM.

## 2023-02-19 PROBLEM — R10.31 RIGHT GROIN PAIN: Status: RESOLVED | Noted: 2020-08-27 | Resolved: 2023-02-19

## 2023-02-19 PROBLEM — H65.02 ACUTE SEROUS OTITIS MEDIA OF LEFT EAR: Status: RESOLVED | Noted: 2022-08-15 | Resolved: 2023-02-19

## 2023-02-19 PROBLEM — K21.9 GASTROESOPHAGEAL REFLUX DISEASE: Status: ACTIVE | Noted: 2023-02-19

## 2023-02-19 PROBLEM — K76.0 FATTY LIVER: Status: ACTIVE | Noted: 2021-04-09

## 2023-02-19 PROBLEM — G57.93 NEUROPATHIC PAIN OF BOTH LEGS: Status: RESOLVED | Noted: 2020-07-13 | Resolved: 2023-02-19

## 2023-02-19 PROBLEM — F41.8 MIXED ANXIETY AND DEPRESSIVE DISORDER: Status: ACTIVE | Noted: 2019-04-23

## 2023-02-19 PROBLEM — Z12.31 ENCOUNTER FOR SCREENING MAMMOGRAM FOR BREAST CANCER: Status: ACTIVE | Noted: 2023-02-19

## 2023-02-19 PROBLEM — K76.0 FATTY LIVER: Status: RESOLVED | Noted: 2021-04-09 | Resolved: 2023-02-19

## 2023-02-19 PROBLEM — E78.2 MIXED HYPERLIPIDEMIA: Status: ACTIVE | Noted: 2023-02-19

## 2023-02-19 PROBLEM — R53.82 CHRONIC FATIGUE: Status: ACTIVE | Noted: 2023-02-19

## 2023-02-19 PROBLEM — G89.18 POSTOPERATIVE PAIN: Status: RESOLVED | Noted: 2020-08-27 | Resolved: 2023-02-19

## 2023-02-19 ASSESSMENT — ENCOUNTER SYMPTOMS
NAUSEA: 0
WHEEZING: 0
DIARRHEA: 0
SHORTNESS OF BREATH: 0
ABDOMINAL PAIN: 0
EYES NEGATIVE: 1
CONSTIPATION: 0
COUGH: 0
ORTHOPNEA: 0

## 2023-02-19 NOTE — ASSESSMENT & PLAN NOTE
Uncontrolled, continue current medications and medication adherence emphasized. Recommend keeping the same dose of Wellbutrin and starting citalopram 10 mg daily. I've explained to her that drugs of the SSRI class can have side effects such as weight gain, sexual dysfunction, insomnia, headache, nausea. These medications are generally effective at alleviating symptoms of anxiety and/or depression. Let me know if significant side effects do occur.

## 2023-02-19 NOTE — ASSESSMENT & PLAN NOTE
Unclear control, continue current medications. Instructed patient to monitor blood pressure at home and keep a log. Increase water and activity level, decrease salt in diet.

## 2023-02-19 NOTE — ASSESSMENT & PLAN NOTE
Uncontrolled, lifestyle modifications recommended.   Restart pantoprazole 40 mg daily, 30 minutes prior to breakfast.

## 2023-02-20 DIAGNOSIS — I10 PRIMARY HYPERTENSION: ICD-10-CM

## 2023-02-20 RX ORDER — PRAZOSIN HYDROCHLORIDE 1 MG/1
1 CAPSULE ORAL 2 TIMES DAILY
Qty: 60 CAPSULE | Refills: 5 | Status: SHIPPED | OUTPATIENT
Start: 2023-02-20 | End: 2023-04-21

## 2023-02-20 NOTE — TELEPHONE ENCOUNTER
Jose Stanford is calling to request a refill on the following medication(s):  Requested Prescriptions     Pending Prescriptions Disp Refills    prazosin (MINIPRESS) 1 MG capsule 60 capsule 5     Sig: Take 1 capsule by mouth 2 times daily       Last Visit Date (If Applicable):  3/07/6189    Next Visit Date:    3/20/2023

## 2023-03-21 PROBLEM — Z12.31 ENCOUNTER FOR SCREENING MAMMOGRAM FOR BREAST CANCER: Status: RESOLVED | Noted: 2023-02-19 | Resolved: 2023-03-21

## 2023-05-06 DIAGNOSIS — I10 PRIMARY HYPERTENSION: Primary | ICD-10-CM

## 2023-05-06 DIAGNOSIS — J30.1 SEASONAL ALLERGIC RHINITIS DUE TO POLLEN: ICD-10-CM

## 2023-05-08 RX ORDER — LISINOPRIL 10 MG/1
TABLET ORAL
Qty: 45 TABLET | Refills: 0 | OUTPATIENT
Start: 2023-05-08

## 2023-05-08 RX ORDER — FLUTICASONE PROPIONATE 50 MCG
SPRAY, SUSPENSION (ML) NASAL
Qty: 16 G | Refills: 2 | Status: SHIPPED | OUTPATIENT
Start: 2023-05-08

## 2023-07-23 ENCOUNTER — HOSPITAL ENCOUNTER (EMERGENCY)
Age: 48
Discharge: HOME | End: 2023-07-23
Attending: EMERGENCY MEDICINE

## 2023-07-23 VITALS
RESPIRATION RATE: 18 BRPM | HEART RATE: 90 BPM | SYSTOLIC BLOOD PRESSURE: 136 MMHG | WEIGHT: 205 LBS | HEIGHT: 64 IN | OXYGEN SATURATION: 100 % | BODY MASS INDEX: 35 KG/M2 | TEMPERATURE: 98.5 F | DIASTOLIC BLOOD PRESSURE: 79 MMHG

## 2023-07-23 DIAGNOSIS — J02.9 ACUTE PHARYNGITIS, UNSPECIFIED ETIOLOGY: Primary | ICD-10-CM

## 2023-07-23 LAB
S PYO AG THROAT QL: NEGATIVE
SPECIMEN SOURCE: NORMAL

## 2023-07-23 PROCEDURE — 6370000000 HC RX 637 (ALT 250 FOR IP): Performed by: EMERGENCY MEDICINE

## 2023-07-23 PROCEDURE — 6360000002 HC RX W HCPCS: Performed by: EMERGENCY MEDICINE

## 2023-07-23 PROCEDURE — 87651 STREP A DNA AMP PROBE: CPT

## 2023-07-23 PROCEDURE — 99284 EMERGENCY DEPT VISIT MOD MDM: CPT

## 2023-07-23 PROCEDURE — 96372 THER/PROPH/DIAG INJ SC/IM: CPT

## 2023-07-23 RX ORDER — PREDNISONE 20 MG/1
40 TABLET ORAL ONCE
Status: COMPLETED | OUTPATIENT
Start: 2023-07-23 | End: 2023-07-23

## 2023-07-23 RX ORDER — KETOROLAC TROMETHAMINE 30 MG/ML
30 INJECTION, SOLUTION INTRAMUSCULAR; INTRAVENOUS ONCE
Status: COMPLETED | OUTPATIENT
Start: 2023-07-23 | End: 2023-07-23

## 2023-07-23 RX ORDER — PREDNISONE 10 MG/1
TABLET ORAL
Qty: 20 TABLET | Refills: 0 | Status: SHIPPED | OUTPATIENT
Start: 2023-07-23 | End: 2023-08-02

## 2023-07-23 RX ADMIN — KETOROLAC TROMETHAMINE 30 MG: 30 INJECTION, SOLUTION INTRAMUSCULAR; INTRAVENOUS at 21:26

## 2023-07-23 RX ADMIN — PREDNISONE 40 MG: 20 TABLET ORAL at 22:08

## 2023-07-23 ASSESSMENT — PAIN - FUNCTIONAL ASSESSMENT
PAIN_FUNCTIONAL_ASSESSMENT: 0-10
PAIN_FUNCTIONAL_ASSESSMENT: 0-10

## 2023-07-23 ASSESSMENT — PAIN SCALES - GENERAL
PAINLEVEL_OUTOF10: 5
PAINLEVEL_OUTOF10: 2

## 2023-07-23 ASSESSMENT — PAIN DESCRIPTION - LOCATION: LOCATION: THROAT

## 2023-07-24 NOTE — ED PROVIDER NOTES
[facility] ED  [address]  Phone: [phone]      Pt Name: Patricia Fuentes  IOE:[id number]  Birthdate 1975  Date of evaluation: 7/23/2023      CHIEF COMPLAINT       Chief Complaint   Patient presents with    Pharyngitis     Pt c/o sore throat started this morning, denies V/D, no meds PTA, NAD noted       HISTORY OF PRESENT ILLNESS   42-year-old female presents to the emergency department today complaining daylong history of sore throat. Hurts worse on the left when compared to the right. She states she cannot swallow. She complains of generalized body aches as well as fatigue. No known sick contacts. No nasal congestion or cough. No shortness of breath. No mitigating precipitating or exacerbating factors. There has been no other contemporaneous evaluation or management of the symptoms prior to arrival.    REVIEW OF SYSTEMS     Review of Systems   All other systems reviewed and are negative. PAST MEDICAL HISTORY    has a past medical history of Anxiety, Chronic back pain, Depression, Hypertension, and Neuropathy. SURGICAL HISTORY      has a past surgical history that includes Hysterectomy, total abdominal (2009); [place] tooth extraction; Carpal tunnel release (Right, 04/2022); Finger trigger release (Right, 01/23/2023); cardiovascular stress test (08/2020); and Cardiac catheterization (08/25/2020). CURRENT MEDICATIONS       Previous Medications    AMLODIPINE (NORVASC) 2.5 MG TABLET    Take 1 tablet by mouth daily    ATORVASTATIN (LIPITOR) 10 MG TABLET    Take 1 tablet by mouth daily    BLOOD PRESSURE KIT    Use to check blood pressure daily.     BUPROPION (WELLBUTRIN XL) 300 MG EXTENDED RELEASE TABLET    take 1 tablet by mouth every morning    BUSPIRONE (BUSPAR) 5 MG TABLET    Take 1 tablet by mouth 2 times daily    CITALOPRAM (CELEXA) 10 MG TABLET    Take 1 tablet by mouth daily    CLINDAMYCIN (CLEOCIN) 2 % VAGINAL CREAM    Place vaginally nightly for 7

## 2023-07-25 LAB
MICROORGANISM/AGENT SPEC: NORMAL
SPECIMEN DESCRIPTION: NORMAL

## 2023-07-31 DIAGNOSIS — G57.93 NEUROPATHIC PAIN OF BOTH LEGS: ICD-10-CM

## 2023-07-31 DIAGNOSIS — G25.81 RESTLESS LEG SYNDROME: ICD-10-CM

## 2023-07-31 NOTE — TELEPHONE ENCOUNTER
Jennifer Lucas is calling to request a refill on the following medication(s):  Requested Prescriptions     Pending Prescriptions Disp Refills    gabapentin (NEURONTIN) 300 MG capsule 30 capsule 5     Sig: TAKE ONE CAPSULE BY MOUTH QHS for RLS       Last Visit Date (If Applicable):  2/91/1834    Next Visit Date:    Visit date not found

## 2023-08-01 RX ORDER — GABAPENTIN 300 MG/1
CAPSULE ORAL
Qty: 30 CAPSULE | Refills: 5 | OUTPATIENT
Start: 2023-08-01 | End: 2023-08-29

## 2023-08-15 DIAGNOSIS — I10 PRIMARY HYPERTENSION: ICD-10-CM

## 2023-08-15 DIAGNOSIS — K21.9 GASTROESOPHAGEAL REFLUX DISEASE, UNSPECIFIED WHETHER ESOPHAGITIS PRESENT: ICD-10-CM

## 2023-08-15 DIAGNOSIS — F41.8 MIXED ANXIETY AND DEPRESSIVE DISORDER: ICD-10-CM

## 2023-08-15 RX ORDER — HYDROCHLOROTHIAZIDE 25 MG/1
25 TABLET ORAL EVERY MORNING
Qty: 30 TABLET | Refills: 3 | Status: SHIPPED | OUTPATIENT
Start: 2023-08-15

## 2023-08-15 RX ORDER — CITALOPRAM HYDROBROMIDE 10 MG/1
TABLET ORAL
Qty: 30 TABLET | Refills: 3 | Status: SHIPPED | OUTPATIENT
Start: 2023-08-15

## 2023-08-15 RX ORDER — PANTOPRAZOLE SODIUM 40 MG/1
TABLET, DELAYED RELEASE ORAL
Qty: 60 TABLET | Refills: 3 | Status: SHIPPED | OUTPATIENT
Start: 2023-08-15

## 2023-08-15 NOTE — TELEPHONE ENCOUNTER
Chilo Faith is calling to request a refill on the following medication(s):  Requested Prescriptions     Pending Prescriptions Disp Refills    hydroCHLOROthiazide (HYDRODIURIL) 25 MG tablet [Pharmacy Med Name: HYDROCHLOROTHIAZIDE 25 MG TAB] 30 tablet 3     Sig: take 1 tablet by mouth every morning    pantoprazole (PROTONIX) 40 MG tablet [Pharmacy Med Name: PANTOPRAZOLE SOD DR 40 MG TAB] 60 tablet 3     Sig: take 1 tablet by mouth twice a day before meals    citalopram (CELEXA) 10 MG tablet [Pharmacy Med Name: CITALOPRAM HBR 10 MG TABLET] 30 tablet 3     Sig: take 1 tablet by mouth once daily       Last Visit Date (If Applicable):  3/02/0007    Next Visit Date:    Visit date not found

## 2024-05-30 NOTE — PROGRESS NOTES
[facility]    Subjective:     Patient ID: Tilman Osler is a 40 y.o. y.o. female. HPI Patient in for virtual visit for depression follow up. At the last visit she was placed back on her Buspar 5 mg BID. She is also taking wellbutrin. She states that she feels like it is working but it doesn't last as long as it should. She would like an increase in this.      Past Medical History:   Diagnosis Date    Anxiety     Chronic back pain     Depression     Hypertension     Neuropathy        Past Surgical History:   Procedure Laterality Date    HYSTERECTOMY, TOTAL ABDOMINAL      WISDOM TOOTH EXTRACTION         Family History   Problem Relation Age of Onset    Crohn's Disease Mother     Hypertension Mother     Heart Disease Father     Heart Surgery Father         CABG x 4 vessel    Hypertension Father     Heart Attack Sister         asystole, pacemaker, age 45 yrs   Lynne Latin Hypertension Sister     Heart Failure Brother         age 36 yrs   Lynne Latin Hypertension Brother     Heart Disease Maternal Grandmother          at age 58 yr    Heart Disease Paternal Grandmother          at age 61 yrs   Lynne Latin Heart Disease Paternal Grandfather          at age 71 yrs        No Known Allergies    Current Outpatient Medications   Medication Sig Dispense Refill    busPIRone (BUSPAR) 7.5 MG tablet Take 1 tablet by mouth 2 times daily 60 tablet 5    lisinopril (PRINIVIL;ZESTRIL) 10 MG tablet Take 1.5 tablets by mouth daily 30 tablet 0    ibuprofen (ADVIL;MOTRIN) 600 MG tablet Take 1 tablet by mouth every 6 hours as needed for Pain 30 tablet 0    ondansetron (ZOFRAN) 4 MG tablet Take 1 tablet by mouth every 8 hours as needed for Nausea 20 tablet 0    cyclobenzaprine (FLEXERIL) 10 MG tablet TAKE 1 TABLET BY MOUTH 3 TIMES A DAY AS NEEDED FOR MUSCLE SPASMS 90 tablet 0    gabapentin (NEURONTIN) 300 MG capsule TAKE 1 CAPSULE BY MOUTH 2 TIMES A DAY FOR 30 DAYS 60 capsule 0    hydroCHLOROthiazide (HYDRODIURIL) 12.5 MG tablet Take 1 tablet by mouth every morning 30 tablet 5    mirtazapine (REMERON) 15 MG tablet TAKE ONE TABLET BY MOUTH EVERY EVENING 30 tablet 3    estrogens, conjugated, (PREMARIN) 0.625 MG tablet Take 1 tablet by mouth daily 90 tablet 3    clindamycin (CLEOCIN) 2 % vaginal cream Place vaginally nightly for 7 nights 1 Tube 0    prazosin (MINIPRESS) 1 MG capsule Take 1 capsule by mouth nightly 30 capsule 5    buPROPion (WELLBUTRIN XL) 300 MG extended release tablet TAKE ONE TABLET BY MOUTH EVERY MORNING 30 tablet 3    rOPINIRole (REQUIP) 1 MG tablet TAKE 1 TABLET BY MOUTH EVERY NIGHT AT BEDTIME 30 tablet 0    ibuprofen (ADVIL;MOTRIN) 800 MG tablet Take 1 tablet by mouth every 6 hours as needed for Pain 120 tablet 3    pantoprazole (PROTONIX) 40 MG tablet Take 1 tablet by mouth daily 30 tablet 11    fluticasone (FLONASE) 50 MCG/ACT nasal spray 1 spray by Nasal route daily 1 Bottle 0    atorvastatin (LIPITOR) 10 MG tablet Take 1 tablet by mouth daily 30 tablet 3     No current facility-administered medications for this visit. Review of Systems   Constitutional: Negative. Negative for activity change and appetite change. Respiratory: Negative. Cardiovascular: Negative. Gastrointestinal: Negative. Psychiatric/Behavioral: Negative for agitation, dysphoric mood, self-injury, sleep disturbance and suicidal ideas. The patient is not nervous/anxious. Objective: There were no vitals taken for this visit. Physical Exam  Nursing note reviewed. Constitutional:       Appearance: Normal appearance. HENT:      Head: Normocephalic and atraumatic. Nose: Nose normal.   Pulmonary:      Effort: Pulmonary effort is normal.      Comments: No distress noted  Neurological:      Mental Status: She is alert and oriented to person, place, and time.    Psychiatric:         Attention and Perception: Attention and perception normal.         Mood and Affect: Mood and affect normal. Speech: Speech normal.         Behavior: Behavior normal.         Thought Content: Thought content normal.         Cognition and Memory: Cognition normal.         Judgment: Judgment normal.           Assessment & Plan:      1. Depression, unspecified depression type-chronic stable  Will increase   - busPIRone (BUSPAR) 7.5 MG tablet; Take 1 tablet by mouth 2 times daily  Dispense: 60 tablet; Refill: 5    2. Hypertension, unspecified type-chronic    - lisinopril (PRINIVIL;ZESTRIL) 10 MG tablet; Take 1.5 tablets by mouth daily  Dispense: 30 tablet; Refill: 0    She is due for annual visit. Needs to make appmt within the month. She is agreeable at this time. Answered all of the patient's questions. Agrees with plan of care. Follow up in 4 weeks for annual exam.      Galina Gonzalez is a 40 y.o. female being evaluated by a Virtual Visit (video visit) encounter to address concerns as mentioned above. A caregiver was present when appropriate. Due to this being a TeleHealth encounter (During KJBEZ-29 public health emergency), evaluation of the following organ systems was limited: Vitals/Constitutional/EENT/Resp/CV/GI//MS/Neuro/Skin/Heme-Lymph-Imm. Pursuant to the emergency declaration under the [address] [place] [address], [address] [place] [address] authority and the [employer] and [employer] Act, this Virtual Visit was conducted with patient's (and/or legal guardian's) consent, to reduce the patient's risk of exposure to COVID-19 and provide necessary medical care. The patient (and/or legal guardian) has also been advised to contact this office for worsening conditions or problems, and seek emergency medical treatment and/or call 911 if deemed necessary.      Patient identification was verified at the start of the visit: Yes    Total time spent for this encounter: Not billed by time    Services were provided through a video synchronous discussion virtually to substitute for in-person clinic visit. Patient and provider were located at their individual homes. --Leopold Armor, APRN - CNP on 9/3/2020 at 2:09 PM    An electronic signature was used to authenticate this note.     Leopold Armor, APRN - CNP   9/3/2020 1:57 PM EDT Detail Level: Detailed